# Patient Record
Sex: FEMALE | Race: WHITE
[De-identification: names, ages, dates, MRNs, and addresses within clinical notes are randomized per-mention and may not be internally consistent; named-entity substitution may affect disease eponyms.]

---

## 2017-07-29 ENCOUNTER — HEALTH MAINTENANCE LETTER (OUTPATIENT)
Age: 32
End: 2017-07-29

## 2017-09-13 ENCOUNTER — TRANSFERRED RECORDS (OUTPATIENT)
Dept: HEALTH INFORMATION MANAGEMENT | Facility: CLINIC | Age: 32
End: 2017-09-13

## 2017-09-19 ENCOUNTER — TRANSFERRED RECORDS (OUTPATIENT)
Dept: HEALTH INFORMATION MANAGEMENT | Facility: CLINIC | Age: 32
End: 2017-09-19

## 2017-09-22 ENCOUNTER — PRE VISIT (OUTPATIENT)
Dept: ORTHOPEDICS | Facility: CLINIC | Age: 32
End: 2017-09-22

## 2017-09-22 ASSESSMENT — ENCOUNTER SYMPTOMS
BACK PAIN: 0
ARTHRALGIAS: 1
STIFFNESS: 1
MUSCLE CRAMPS: 0
NECK PAIN: 0
MYALGIAS: 0
MUSCLE WEAKNESS: 0
SKIN CHANGES: 1
NAIL CHANGES: 0
JOINT SWELLING: 1
POOR WOUND HEALING: 0

## 2017-09-22 NOTE — TELEPHONE ENCOUNTER
1.  Date/reason for appt: 9/25/17 left knee     2.  Referring provider: TOM SALAS     3.  Call to patient (Yes / No - short description): spoke with patient     4.  Previous care at / records requested from: [facility]     5.  Other: faxed request

## 2017-09-25 ENCOUNTER — OFFICE VISIT (OUTPATIENT)
Dept: ORTHOPEDICS | Facility: CLINIC | Age: 32
End: 2017-09-25

## 2017-09-25 VITALS — WEIGHT: 159.2 LBS | BODY MASS INDEX: 26.52 KG/M2 | HEIGHT: 65 IN

## 2017-09-25 DIAGNOSIS — M25.562 LEFT KNEE PAIN, UNSPECIFIED CHRONICITY: Primary | ICD-10-CM

## 2017-09-25 NOTE — PROGRESS NOTES
HISTORY OF PRESENT ILLNESS:  Claudia is a pleasant 32-year-old female with a long history regarding her left knee.  She states that she initially had surgery on her left knee at approximately the age of 18 where she had what sounds to be a plica excision.  She then went on to have a second arthroscopy and staging.  Her most recent surgery was approximately 2 years ago when she underwent what appears to be an arthroscopic lateral release.  She has noted considerable instability since that time where she does not feel she can keep her patella located.  This happens to her frequently.  These episodes cause pain and swelling.  It keeps her from doing what she wants to do.  She states that overall her knee feels weak to her.        She has been referred to me for definitive management as she has felt that she would maybe need an MPFL reconstruction.        PAST MEDICAL HISTORY:  Significant for Raynaud's disease, IgG deficiency, history of lung diagnoses where she has been seen at the [facility] as well as multiple dislocations.  It has been discussed that she may have a hypermobility disorder consistent with Chapis-Danlos or Marfan's syndrome.  This has never been fully diagnosed.      MEDICATIONS:  Per Epic.       ALLERGIES:  Cefzil and Zithromax.      FAMILY HISTORY:  No family history of bleeding or anesthesia problems.      SOCIAL HISTORY:  She is a nonsmoker.  She lives in the [place] area.      REVIEW OF SYSTEMS:  Negative for fever, chills, sweats.  No chest pain, shortness of breath, no nausea, vomiting, no bowel or bladder symptoms.  Ten other systems reviewed are negative.      PHYSICAL EXAMINATION:  On exam today, Claudia is seen to stand 5 feet 5 inches tall.  She weighs 160 pounds.  Her BMI is 26.  Visual inspection shows no redness, no drainage, no suggestion of infection.  Three-quadrant lateral translation of the patella is present.  Tilt to 20 degrees above neutral.  Profound apprehension with lateral  translation of the patella.  Improved with stabilization of the patella.  No definite J tracking.  Lachman 0.  No pivot shift.  Stable to varus and valgus stress.  Stable posterior drawer.  Neurovascularly intact distally.      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      PLAN:  I had a long discussion today with Claudia.  At this time, I reviewed with her, her diagnosis as well as her MRI with her.  I do think she has recurrent patellar instability.  It seems to dislocate on her.  We could recreate her symptoms in clinic with profound apprehension.  She has done multiple courses of bracing and therapy without lasting benefit.  At this time, our recommendation is to proceed with an examination under anesthesia, knee arthroscopy and evaluation of her intraarticular cartilage surfaces including the tibia, femur and meniscus.  We will then plan for an MPFL reconstruction with allograft.  We may perform a closure of her prior performed lateral retinacular release to help improve the stability of her patella.      I discussed with her explicitly the risks, benefits, complications, techniques and alternatives to surgery.  We have reviewed the expected course of recovery and the alternative treatment options.  She understands the limitations of surgery.  She understands that this is better stability increasing surgery than pain decreasing surgery.      We will look for a time on the schedule when this could be complete.  She understands that she may require future surgery and hopefully this surgery will provide a staging opportunity.

## 2017-09-25 NOTE — MR AVS SNAPSHOT
"              After Visit Summary   9/25/2017    Claudia Muñoz    MRN: [id number]           Patient Information     Date Of Birth          1985        Visit Information        Provider Department      9/25/2017 11:10 AM Paul Oakley MD [facility]        Today's Diagnoses     Left knee pain, unspecified chronicity    -  1       Follow-ups after your visit        Who to contact     Please call your clinic at [phone] to:    Ask questions about your health    Make or cancel appointments    Discuss your medicines    Learn about your test results    Speak to your doctor   If you have compliments or concerns about an experience at your clinic, or if you wish to file a complaint, please contact [facility] Patient Relations at [phone] or email us at [email or web address]         Additional Information About Your Visit        MyChart Information     [email or web address] gives you secure access to your electronic health record. If you see a primary care provider, you can also send messages to your care team and make appointments. If you have questions, please call your primary care clinic.  If you do not have a primary care provider, please call [phone] and they will assist you.      PiperScout is an electronic gateway that provides easy, online access to your medical records. With [employer]per[employer]out, you can request a clinic appointment, read your test results, renew a prescription or communicate with your care team.     To access your existing account, please contact your [facility] or call [phone] for assistance.        Care EveryWhere ID     This is your Care EveryWhere ID. This could be used by other organizations to access your [place] medical records  [id number]        Your Vitals Were     Height BMI (Body Mass Index)                1.651 m (5' 5\") 26.49 kg/m2           Blood Pressure from Last 3 Encounters:   04/17/15 " 117/79    Weight from Last 3 Encounters:   09/25/17 72.2 kg (159 lb 3.2 oz)   04/17/15 73.8 kg (162 lb 9.6 oz)              We Performed the Following     Jo-Operative Worksheet        Primary Care Provider Office Phone # Fax #    [facility] [phone]       [address] [facility] [address]        Equal Access to Services     FAUSTO DAVILA : Hadii aad ku hadasho Soomaali, waaxda luqadaha, qaybta kaalmada adeegyada, waxay idiin hayaan adeeg khjoo laNolanzafar . So [facility] [phone].    ATENCIÓN: Si lonny chacon, tiene a azul disposición servicios gratuitos de asistencia lingüística. Llame al [phone].    We comply with applicable federal civil rights laws and [place] laws. We do not discriminate on the basis of race, color, national origin, age, disability sex, sexual orientation or gender identity.            Thank you!     Thank you for choosing [facility]  for your care. Our goal is always to provide you with excellent care. Hearing back from our patients is one way we can continue to improve our services. Please take a few minutes to complete the written survey that you may receive in the mail after your visit with us. Thank you!             Your Updated Medication List - Protect others around you: Learn how to safely use, store and throw away your medicines at www.disposemymeds.org.          This list is accurate as of: 9/25/17 11:59 PM.  Always use your most recent med list.                   Brand Name Dispense Instructions for use Diagnosis    AMLODIPINE BESYLATE PO      Take 10 mg by mouth Take daily in the winter for Raynaud's        PROAIR  (90 BASE) MCG/ACT Inhaler   Generic drug:  albuterol      Inhale 2 puffs into the lungs every 4 hours as needed for shortness of breath / dyspnea or wheezing        traZODone 50 MG tablet    DESYREL     Take 50 mg by mouth At Bedtime        ZOVIA 1/35E (28) 1-35 MG-MCG per tablet   Generic drug:   ethynodiol-ethinyl estradiol      Take 1 tablet by mouth daily

## 2017-09-25 NOTE — TELEPHONE ENCOUNTER
Clinic nurse calling,     did we get any records yet?  No, not in [facility].    appt notes say something about sent to [phone], who is that?   Not [facility], sounds more like a clinic fax; nurse will check addl clinic faxes for records.

## 2017-09-25 NOTE — LETTER
9/25/2017      RE: Claudia Muñoz  [address]  [facility] [address]       HISTORY OF PRESENT ILLNESS:  Claudia is a pleasant 32-year-old female with a long history regarding her left knee.  She states that she initially had surgery on her left knee at approximately the age of 18 where she had what sounds to be a plica excision.  She then went on to have a second arthroscopy and staging.  Her most recent surgery was approximately 2 years ago when she underwent what appears to be an arthroscopic lateral release.  She has noted considerable instability since that time where she does not feel she can keep her patella located.  This happens to her frequently.  These episodes cause pain and swelling.  It keeps her from doing what she wants to do.  She states that overall her knee feels weak to her.        She has been referred to me for definitive management as she has felt that she would maybe need an MPFL reconstruction.        PAST MEDICAL HISTORY:  Significant for Raynaud's disease, IgG deficiency, history of lung diagnoses where she has been seen at the [facility] as well as multiple dislocations.  It has been discussed that she may have a hypermobility disorder consistent with Chapis-Danlos or Marfan's syndrome.  This has never been fully diagnosed.      MEDICATIONS:  Per Epic.       ALLERGIES:  Cefzil and Zithromax.      FAMILY HISTORY:  No family history of bleeding or anesthesia problems.      SOCIAL HISTORY:  She is a nonsmoker.  She lives in the [facility].      REVIEW OF SYSTEMS:  Negative for fever, chills, sweats.  No chest pain, shortness of breath, no nausea, vomiting, no bowel or bladder symptoms.  Ten other systems reviewed are negative.      PHYSICAL EXAMINATION:  On exam today, Claudia is seen to stand 5 feet 5 inches tall.  She weighs 160 pounds.  Her BMI is 26.  Visual inspection shows no redness, no drainage, no suggestion of infection.  Three-quadrant lateral translation of the  patella is present.  Tilt to 20 degrees above neutral.  Profound apprehension with lateral translation of the patella.  Improved with stabilization of the patella.  No definite J tracking.  Lachman 0.  No pivot shift.  Stable to varus and valgus stress.  Stable posterior drawer.  Neurovascularly intact distally.      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      PLAN:  I had a long discussion today with Claudia.  At this time, I reviewed with her, her diagnosis as well as her MRI with her.  I do think she has recurrent patellar instability.  It seems to dislocate on her.  We could recreate her symptoms in clinic with profound apprehension.  She has done multiple courses of bracing and therapy without lasting benefit.  At this time, our recommendation is to proceed with an examination under anesthesia, knee arthroscopy and evaluation of her intraarticular cartilage surfaces including the tibia, femur and meniscus.  We will then plan for an MPFL reconstruction with allograft.  We may perform a closure of her prior performed lateral retinacular release to help improve the stability of her patella.      I discussed with her explicitly the risks, benefits, complications, techniques and alternatives to surgery.  We have reviewed the expected course of recovery and the alternative treatment options.  She understands the limitations of surgery.  She understands that this is better stability increasing surgery than pain decreasing surgery.      We will look for a time on the schedule when this could be complete.  She understands that she may require future surgery and hopefully this surgery will provide a staging opportunity.         Paul Oakley MD

## 2017-09-25 NOTE — NURSING NOTE
"Reason For Visit:   Chief Complaint   Patient presents with     Consult     Left knee pain.        Primary MD: Dr Ginette Cornelius  Ref. MD: Dr Sharp    ?  No  Occupation [employer].  Currently working? Yes.  Work status?  Full time.  Date of injury: none  Date of surgery: 2015  Type of surgery: Arthroscopic release.  Hx of 3-4 surgeries.     Smoker: No      Ht 1.651 m (5' 5\")  Wt 72.2 kg (159 lb 3.2 oz)  BMI 26.49 kg/m2    Pain Assessment  Patient Currently in Pain: Yes  0-10 Pain Scale: 7  Primary Pain Location: Knee  Pain Orientation: Left                                                   Adriana Brewster LPN  "

## 2017-09-25 NOTE — NURSING NOTE
Teaching Flowsheet   Relevant Diagnosis: Left knee cartilage loss  Teaching Topic: Left knee MPFL     Person(s) involved in teaching:   Patient and family member     Motivation Level:  Asks Questions: Yes  Eager to Learn: Yes  Cooperative: Yes  Receptive (willing/able to accept information): Yes  Any cultural factors/Yazidism beliefs that may influence understanding or compliance? No     Patient and Family demonstrates understanding of the following:  Reason for the appointment, diagnosis and treatment plan: Yes  Knowledge of proper use of medications and conditions for which they are ordered (with special attention to potential side effects or drug interactions): Yes  Which situations necessitate calling provider and whom to contact: Yes     Teaching Concerns Addressed:      Proper use and care of assistive devices. (medical equip, care aids, etc.): Yes  Nutritional needs and diet plan: NA  Pain management techniques: Yes  Wound Care: Yes  How and/when to access community resources: Yes     Instructional Materials Used/Given: Preoperative/postoperative teaching packet, surgical soap.     Health history negative per patient.    a. Does the patient take five or more prescription medications? No  b. Does patient have difficulty walking up two flights of stairs? No  c. Is patient s BMI 35 or greater? No  d. Is patient an insulin dependent diabetic? No  e. Does patient have a history of having a heart attack or a heart surgery of any kind? No  f. Does patient have a history of heart failure? No  g. Does the patient have a history of any type of transplant? No  h. Does the patient use inhalers on a daily basis? No  i. Does the patient have a history of pulmonary hypertension? No  Once the patient is evaluated by PAC contact (ex: Surgery schedulers name and number, RN's name and number, etc..);  [phone] Amada  Name of planned surgery______________________________

## 2017-10-17 ENCOUNTER — TRANSFERRED RECORDS (OUTPATIENT)
Dept: HEALTH INFORMATION MANAGEMENT | Facility: CLINIC | Age: 32
End: 2017-10-17

## 2017-10-30 ENCOUNTER — ANESTHESIA EVENT (OUTPATIENT)
Dept: SURGERY | Facility: AMBULATORY SURGERY CENTER | Age: 32
End: 2017-10-30

## 2017-10-31 ENCOUNTER — ANESTHESIA (OUTPATIENT)
Dept: SURGERY | Facility: AMBULATORY SURGERY CENTER | Age: 32
End: 2017-10-31

## 2017-10-31 ENCOUNTER — SURGERY (OUTPATIENT)
Age: 32
End: 2017-10-31

## 2017-10-31 ENCOUNTER — HOSPITAL ENCOUNTER (OUTPATIENT)
Facility: AMBULATORY SURGERY CENTER | Age: 32
End: 2017-10-31
Attending: ORTHOPAEDIC SURGERY

## 2017-10-31 VITALS
RESPIRATION RATE: 16 BRPM | HEIGHT: 65 IN | TEMPERATURE: 97.6 F | DIASTOLIC BLOOD PRESSURE: 59 MMHG | WEIGHT: 150 LBS | SYSTOLIC BLOOD PRESSURE: 94 MMHG | BODY MASS INDEX: 24.99 KG/M2 | OXYGEN SATURATION: 98 % | HEART RATE: 78 BPM

## 2017-10-31 DIAGNOSIS — M25.362 PATELLAR INSTABILITY OF LEFT KNEE: Primary | ICD-10-CM

## 2017-10-31 LAB
HCG UR QL: NEGATIVE
INTERNAL QC OK POCT: YES

## 2017-10-31 RX ORDER — ACETAMINOPHEN 325 MG/1
650 TABLET ORAL
Status: DISCONTINUED | OUTPATIENT
Start: 2017-10-31 | End: 2017-11-01 | Stop reason: HOSPADM

## 2017-10-31 RX ORDER — LIDOCAINE HYDROCHLORIDE 20 MG/ML
INJECTION, SOLUTION INFILTRATION; PERINEURAL PRN
Status: DISCONTINUED | OUTPATIENT
Start: 2017-10-31 | End: 2017-10-31

## 2017-10-31 RX ORDER — FENTANYL CITRATE 50 UG/ML
25-50 INJECTION, SOLUTION INTRAMUSCULAR; INTRAVENOUS
Status: DISCONTINUED | OUTPATIENT
Start: 2017-10-31 | End: 2017-10-31 | Stop reason: HOSPADM

## 2017-10-31 RX ORDER — SODIUM CHLORIDE, SODIUM LACTATE, POTASSIUM CHLORIDE, CALCIUM CHLORIDE 600; 310; 30; 20 MG/100ML; MG/100ML; MG/100ML; MG/100ML
INJECTION, SOLUTION INTRAVENOUS CONTINUOUS
Status: DISCONTINUED | OUTPATIENT
Start: 2017-10-31 | End: 2017-11-01 | Stop reason: HOSPADM

## 2017-10-31 RX ORDER — KETOROLAC TROMETHAMINE 30 MG/ML
INJECTION, SOLUTION INTRAMUSCULAR; INTRAVENOUS PRN
Status: DISCONTINUED | OUTPATIENT
Start: 2017-10-31 | End: 2017-10-31

## 2017-10-31 RX ORDER — NALOXONE HYDROCHLORIDE 0.4 MG/ML
.1-.4 INJECTION, SOLUTION INTRAMUSCULAR; INTRAVENOUS; SUBCUTANEOUS
Status: DISCONTINUED | OUTPATIENT
Start: 2017-10-31 | End: 2017-11-01 | Stop reason: HOSPADM

## 2017-10-31 RX ORDER — ONDANSETRON 2 MG/ML
4 INJECTION INTRAMUSCULAR; INTRAVENOUS EVERY 30 MIN PRN
Status: DISCONTINUED | OUTPATIENT
Start: 2017-10-31 | End: 2017-11-01 | Stop reason: HOSPADM

## 2017-10-31 RX ORDER — LIDOCAINE 40 MG/G
CREAM TOPICAL
Status: DISCONTINUED | OUTPATIENT
Start: 2017-10-31 | End: 2017-10-31 | Stop reason: HOSPADM

## 2017-10-31 RX ORDER — FLUMAZENIL 0.1 MG/ML
0.2 INJECTION, SOLUTION INTRAVENOUS
Status: DISCONTINUED | OUTPATIENT
Start: 2017-10-31 | End: 2017-10-31 | Stop reason: HOSPADM

## 2017-10-31 RX ORDER — CLINDAMYCIN PHOSPHATE 900 MG/50ML
900 INJECTION, SOLUTION INTRAVENOUS SEE ADMIN INSTRUCTIONS
Status: DISCONTINUED | OUTPATIENT
Start: 2017-10-31 | End: 2017-10-31 | Stop reason: HOSPADM

## 2017-10-31 RX ORDER — HYDROXYZINE HYDROCHLORIDE 25 MG/1
25 TABLET, FILM COATED ORAL EVERY 6 HOURS PRN
Qty: 60 TABLET | Refills: 0 | Status: SHIPPED | OUTPATIENT
Start: 2017-10-31 | End: 2017-12-11

## 2017-10-31 RX ORDER — BUPIVACAINE HYDROCHLORIDE AND EPINEPHRINE 2.5; 5 MG/ML; UG/ML
INJECTION, SOLUTION INFILTRATION; PERINEURAL PRN
Status: DISCONTINUED | OUTPATIENT
Start: 2017-10-31 | End: 2017-10-31 | Stop reason: HOSPADM

## 2017-10-31 RX ORDER — NALOXONE HYDROCHLORIDE 0.4 MG/ML
.1-.4 INJECTION, SOLUTION INTRAMUSCULAR; INTRAVENOUS; SUBCUTANEOUS
Status: DISCONTINUED | OUTPATIENT
Start: 2017-10-31 | End: 2017-10-31 | Stop reason: HOSPADM

## 2017-10-31 RX ORDER — ACETAMINOPHEN 325 MG/1
975 TABLET ORAL ONCE
Status: COMPLETED | OUTPATIENT
Start: 2017-10-31 | End: 2017-10-31

## 2017-10-31 RX ORDER — PROPOFOL 10 MG/ML
INJECTION, EMULSION INTRAVENOUS PRN
Status: DISCONTINUED | OUTPATIENT
Start: 2017-10-31 | End: 2017-10-31

## 2017-10-31 RX ORDER — GABAPENTIN 300 MG/1
300 CAPSULE ORAL ONCE
Status: COMPLETED | OUTPATIENT
Start: 2017-10-31 | End: 2017-10-31

## 2017-10-31 RX ORDER — DEXAMETHASONE SODIUM PHOSPHATE 4 MG/ML
INJECTION, SOLUTION INTRA-ARTICULAR; INTRALESIONAL; INTRAMUSCULAR; INTRAVENOUS; SOFT TISSUE PRN
Status: DISCONTINUED | OUTPATIENT
Start: 2017-10-31 | End: 2017-10-31

## 2017-10-31 RX ORDER — PROPOFOL 10 MG/ML
INJECTION, EMULSION INTRAVENOUS CONTINUOUS PRN
Status: DISCONTINUED | OUTPATIENT
Start: 2017-10-31 | End: 2017-10-31

## 2017-10-31 RX ORDER — ONDANSETRON 2 MG/ML
INJECTION INTRAMUSCULAR; INTRAVENOUS PRN
Status: DISCONTINUED | OUTPATIENT
Start: 2017-10-31 | End: 2017-10-31

## 2017-10-31 RX ORDER — FENTANYL CITRATE 50 UG/ML
INJECTION, SOLUTION INTRAMUSCULAR; INTRAVENOUS PRN
Status: DISCONTINUED | OUTPATIENT
Start: 2017-10-31 | End: 2017-10-31

## 2017-10-31 RX ORDER — MEPERIDINE HYDROCHLORIDE 25 MG/ML
12.5 INJECTION INTRAMUSCULAR; INTRAVENOUS; SUBCUTANEOUS
Status: DISCONTINUED | OUTPATIENT
Start: 2017-10-31 | End: 2017-11-01 | Stop reason: HOSPADM

## 2017-10-31 RX ORDER — ONDANSETRON 4 MG/1
4-8 TABLET, ORALLY DISINTEGRATING ORAL EVERY 8 HOURS PRN
Qty: 4 TABLET | Refills: 0 | Status: SHIPPED | OUTPATIENT
Start: 2017-10-31 | End: 2017-12-11

## 2017-10-31 RX ORDER — SODIUM CHLORIDE, SODIUM LACTATE, POTASSIUM CHLORIDE, CALCIUM CHLORIDE 600; 310; 30; 20 MG/100ML; MG/100ML; MG/100ML; MG/100ML
INJECTION, SOLUTION INTRAVENOUS CONTINUOUS
Status: DISCONTINUED | OUTPATIENT
Start: 2017-10-31 | End: 2017-10-31 | Stop reason: HOSPADM

## 2017-10-31 RX ORDER — OXYCODONE HYDROCHLORIDE 5 MG/1
5-10 TABLET ORAL
Status: DISCONTINUED | OUTPATIENT
Start: 2017-10-31 | End: 2017-11-01 | Stop reason: HOSPADM

## 2017-10-31 RX ORDER — EPHEDRINE SULFATE 50 MG/ML
INJECTION, SOLUTION INTRAMUSCULAR; INTRAVENOUS; SUBCUTANEOUS PRN
Status: DISCONTINUED | OUTPATIENT
Start: 2017-10-31 | End: 2017-10-31

## 2017-10-31 RX ORDER — HYDROXYZINE HYDROCHLORIDE 25 MG/1
25 TABLET, FILM COATED ORAL
Status: DISCONTINUED | OUTPATIENT
Start: 2017-10-31 | End: 2017-11-01 | Stop reason: HOSPADM

## 2017-10-31 RX ORDER — FENTANYL CITRATE 50 UG/ML
50 INJECTION, SOLUTION INTRAMUSCULAR; INTRAVENOUS ONCE
Status: COMPLETED | OUTPATIENT
Start: 2017-10-31 | End: 2017-10-31

## 2017-10-31 RX ORDER — OXYCODONE HYDROCHLORIDE 5 MG/1
5-10 TABLET ORAL
Qty: 60 TABLET | Refills: 0 | Status: SHIPPED | OUTPATIENT
Start: 2017-10-31 | End: 2017-12-11

## 2017-10-31 RX ORDER — CLINDAMYCIN PHOSPHATE 900 MG/50ML
900 INJECTION, SOLUTION INTRAVENOUS
Status: COMPLETED | OUTPATIENT
Start: 2017-10-31 | End: 2017-10-31

## 2017-10-31 RX ORDER — ONDANSETRON 4 MG/1
4 TABLET, ORALLY DISINTEGRATING ORAL
Status: DISCONTINUED | OUTPATIENT
Start: 2017-10-31 | End: 2017-11-01 | Stop reason: HOSPADM

## 2017-10-31 RX ORDER — FENTANYL CITRATE 50 UG/ML
25-50 INJECTION, SOLUTION INTRAMUSCULAR; INTRAVENOUS
Status: DISCONTINUED | OUTPATIENT
Start: 2017-10-31 | End: 2017-11-01 | Stop reason: HOSPADM

## 2017-10-31 RX ORDER — BUPIVACAINE HYDROCHLORIDE AND EPINEPHRINE 2.5; 5 MG/ML; UG/ML
INJECTION, SOLUTION INFILTRATION; PERINEURAL PRN
Status: DISCONTINUED | OUTPATIENT
Start: 2017-10-31 | End: 2017-10-31

## 2017-10-31 RX ORDER — AMOXICILLIN 250 MG
1-2 CAPSULE ORAL 2 TIMES DAILY
Qty: 30 TABLET | Refills: 0 | Status: SHIPPED | OUTPATIENT
Start: 2017-10-31

## 2017-10-31 RX ORDER — ONDANSETRON 4 MG/1
4 TABLET, ORALLY DISINTEGRATING ORAL EVERY 30 MIN PRN
Status: DISCONTINUED | OUTPATIENT
Start: 2017-10-31 | End: 2017-11-01 | Stop reason: HOSPADM

## 2017-10-31 RX ADMIN — EPHEDRINE SULFATE 5 MG: 50 INJECTION, SOLUTION INTRAMUSCULAR; INTRAVENOUS; SUBCUTANEOUS at 08:26

## 2017-10-31 RX ADMIN — ACETAMINOPHEN 975 MG: 325 TABLET ORAL at 06:33

## 2017-10-31 RX ADMIN — FENTANYL CITRATE 50 MCG: 50 INJECTION, SOLUTION INTRAMUSCULAR; INTRAVENOUS at 07:42

## 2017-10-31 RX ADMIN — EPHEDRINE SULFATE 5 MG: 50 INJECTION, SOLUTION INTRAMUSCULAR; INTRAVENOUS; SUBCUTANEOUS at 08:45

## 2017-10-31 RX ADMIN — PROPOFOL 200 MG: 10 INJECTION, EMULSION INTRAVENOUS at 08:01

## 2017-10-31 RX ADMIN — FENTANYL CITRATE 25 MCG: 50 INJECTION, SOLUTION INTRAMUSCULAR; INTRAVENOUS at 09:33

## 2017-10-31 RX ADMIN — GABAPENTIN 300 MG: 300 CAPSULE ORAL at 06:33

## 2017-10-31 RX ADMIN — ONDANSETRON 4 MG: 2 INJECTION INTRAMUSCULAR; INTRAVENOUS at 10:56

## 2017-10-31 RX ADMIN — LIDOCAINE HYDROCHLORIDE 80 MG: 20 INJECTION, SOLUTION INFILTRATION; PERINEURAL at 08:01

## 2017-10-31 RX ADMIN — DEXAMETHASONE SODIUM PHOSPHATE 4 MG: 4 INJECTION, SOLUTION INTRA-ARTICULAR; INTRALESIONAL; INTRAMUSCULAR; INTRAVENOUS; SOFT TISSUE at 08:04

## 2017-10-31 RX ADMIN — KETOROLAC TROMETHAMINE 30 MG: 30 INJECTION, SOLUTION INTRAMUSCULAR; INTRAVENOUS at 09:43

## 2017-10-31 RX ADMIN — FENTANYL CITRATE 50 MCG: 50 INJECTION, SOLUTION INTRAMUSCULAR; INTRAVENOUS at 08:01

## 2017-10-31 RX ADMIN — BUPIVACAINE HYDROCHLORIDE AND EPINEPHRINE 30 ML: 2.5; 5 INJECTION, SOLUTION INFILTRATION; PERINEURAL at 08:30

## 2017-10-31 RX ADMIN — SODIUM CHLORIDE, SODIUM LACTATE, POTASSIUM CHLORIDE, CALCIUM CHLORIDE: 600; 310; 30; 20 INJECTION, SOLUTION INTRAVENOUS at 06:33

## 2017-10-31 RX ADMIN — FENTANYL CITRATE 25 MCG: 50 INJECTION, SOLUTION INTRAMUSCULAR; INTRAVENOUS at 09:42

## 2017-10-31 RX ADMIN — EPHEDRINE SULFATE 10 MG: 50 INJECTION, SOLUTION INTRAMUSCULAR; INTRAVENOUS; SUBCUTANEOUS at 08:12

## 2017-10-31 RX ADMIN — CLINDAMYCIN PHOSPHATE 900 MG: 900 INJECTION, SOLUTION INTRAVENOUS at 08:03

## 2017-10-31 RX ADMIN — PROPOFOL 200 MCG/KG/MIN: 10 INJECTION, EMULSION INTRAVENOUS at 08:01

## 2017-10-31 RX ADMIN — BUPIVACAINE HYDROCHLORIDE AND EPINEPHRINE 10 ML: 2.5; 5 INJECTION, SOLUTION INFILTRATION; PERINEURAL at 07:35

## 2017-10-31 RX ADMIN — ONDANSETRON 4 MG: 2 INJECTION INTRAMUSCULAR; INTRAVENOUS at 09:26

## 2017-10-31 ASSESSMENT — LIFESTYLE VARIABLES: TOBACCO_USE: 0

## 2017-10-31 ASSESSMENT — ENCOUNTER SYMPTOMS: SEIZURES: 1

## 2017-10-31 NOTE — ANESTHESIA CARE TRANSFER NOTE
Patient: Claudia Muñoz    Procedure(s):  Examination Under Anesthesia Left Knee, Left Knee Arthroscopy, Medial Patellofemoral Reconstruction with Allograft - Wound Class: I-Clean    Diagnosis: Patellar Instability  Diagnosis Additional Information: No value filed.    Anesthesia Type:   General, LMA, Peripheral Nerve Block     Note:  Airway :Face Mask  Patient transferred to:PACU  Comments: Uneventful transport to PACU   Report to RN  Exchanging well; color pink/natl  Pt responds appropriately to command  IV patent  Lips/teeth/dentition as preop status  Questions answered  BP 92.58  HR 89  RR 16  TAT 97.7RR  Sat 100%Handoff Report: Identifed the Patient, Identified the Reponsible Provider, Reviewed the pertinent medical history, Discussed the surgical course, Reviewed Intra-OP anesthesia mangement and issues during anesthesia, Set expectations for post-procedure period and Allowed opportunity for questions and acknowledgement of understanding      Vitals: (Last set prior to Anesthesia Care Transfer)    CRNA VITALS  10/31/2017 0913 - 10/31/2017 0948      10/31/2017             Pulse: 86    SpO2: 100 %    Resp Rate (set): 10                Electronically Signed By: GI ELLIOTT CRNA  October 31, 2017  9:48 AM

## 2017-10-31 NOTE — ADDENDUM NOTE
Addendum  created 10/31/17 1211 by Katia Ricketts APRN CRNA    Anesthesia Intra Flowsheets edited, Anesthesia Intra Meds edited, Anesthesia Intra SmartForms edited

## 2017-10-31 NOTE — IP AVS SNAPSHOT
[facility]    [address] [place] [id number]    Phone:  [phone]    Fax:  [phone]                                       After Visit Summary   10/31/2017    Claudia Muñoz    MRN: [id number]           After Visit Summary Signature Page     I have received my discharge instructions, and my questions have been answered. I have discussed any challenges I see with this plan with the nurse or doctor.    ..........................................................................................................................................  Patient/Patient Representative Signature      ..........................................................................................................................................  Patient Representative Print Name and Relationship to Patient    ..................................................               ................................................  Date                                            Time    ..........................................................................................................................................  Reviewed by Signature/Title    ...................................................              ..............................................  Date                                                            Time

## 2017-10-31 NOTE — PROGRESS NOTES
Left adductor block done preoperatively.  VSS. On 2L NC, sats 95-98%. Tolerated procedure without problems.

## 2017-10-31 NOTE — BRIEF OP NOTE
[facility] Orthopedic Brief Operative Note    Pre-operative diagnosis: Patellar Instability   Post-operative diagnosis: SAME   Procedure: Procedure(s):  ARTHROSCOPY KNEE WITH PATELLAR REALIGNMENT     Surgeon: Paul Oakley*    Assistant(s): Hank Choe MD     Anesthesia: LMA   Estimated blood loss: 10   Total IV fluids: (See anesthesia record)     Drains:   None   Specimens: None   Implants: See dictated operative report for full details.    Findings: See dictated operative report for full details.    Complications: None     Disposition:   Stable to PACU     Plan:  TTWB x1 then WBAT. Wean from crutches and brace after 1 week as tolerated. No running until 8 weeks, side to side sports 3-4 months. Locked HKB x1 week then may unlock at rest for ROM

## 2017-10-31 NOTE — DISCHARGE INSTRUCTIONS
"[facility]  Home Care Following Anesthesia  For 24 hours after surgery:  1. Get plenty of rest.  A responsible adult must stay with you for at least 24 hours after you leave the surgery center.  2. Do not drive or use heavy equipment.  If you have weakness or tingling, don't drive or use heavy equipment until this feeling goes away.   3. Do not drink alcohol.   4. Avoid strenuous or risky activities.  Ask for help when climbing stairs.  5. You may feel lightheaded.  IF so, sit for a few minutes before standing.  Have someone help you get up.   6. If you have nausea (feel sick to your stomach): Drink only clear liquids such as apple juice, ginger ale, broth or 7-Up.  Rest may also help.  Be sure to drink enough fluids.  Move to a regular diet as you feel able.   7. You may have a slight fever.  Call the doctor if your fever is over 100 F (37.7 C) (taken under the tongue) or lasts longer than 24 hours.  8. You may have a dry mouth, a sore throat, muscle aches or trouble sleeping. These should go away after 24 hours.  9. Do not make important or legal decisions.        Today you received an Exparel block to numb the nerves near your surgery site.  This is a block using local anesthetic or \"numbing\" medication injected around the nerves to anesthetize or \"numb\" the area supplied by those nerves.  This block is injected into the muscle layer near your surgical site.  This medication may numb the location where you had surgery up to 72 hours.  If your surgical site is an arm or leg you should be careful with your affected limb, since it is possible to injure your limb without being aware of it due to the numbing.  Until full feeling returns, you should guard against bumping or hitting your limb, and avoid extreme hot or cold temperatures on the skin.  As the block wears off, the feeling will return as a tingling or prickly sensation near your surgical site.  You will experince more " discomfort from your incision as the feeling returns.  You may want to take a pain pill (a narcotic or Tylenol if this was prescribed by your surgeon) when you start to experience mild pain before the pain beomes more severe.  If your pain medications do not control your pain, you should notify your surgeon.    Tips for taking pain medications  To get the best pain relief possible, remember these points:    Take pain medications as directed, before pain becomes severe.    Pain medication can upset your stomach: taking it with food may help.    Constipation is a common side effect of pain medication. Drink plenty of  fluids.    Eat foods high in fiber. Take a stool softener if recommended by your doctor or pharmacist.    Do not drink alcohol, drive or operate machinery while taking pain medications.    Ask about other ways to control pain, such as with heat, ice or relaxation.    Tylenol/Acetaminophen Consumption  To help encourage the safe use of acetaminophen, the makers of TYLENOL  have lowered the maximum daily dose for single-ingredient Extra Strength TYLENOL  (acetaminophen) products sold in the [place] from 8 pills per day (4,000 mg) to 6 pills per day (3,000 mg). The dosing interval has also changed from 2 pills every 4-6 hours to 2 pills every 6 hours.    If you feel your pain relief is insufficient, you may take Tylenol/Acetaminophen in addition to your narcotic pain medication.     Be careful not to exceed 3,000 mg of Tylenol/Acetaminophen in a 24 hour period from all sources.    If you are taking extra strength Tylenol/acetaminophen (500 mg), the maximum dose is 6 tablets in 24 hours.    If you are taking regular strength acetaminophen (325 mg), the maximum dose is 9 tablets in 24 hours.    Call a doctor for any of the followin. Signs of infection (fever, growing tenderness at the surgery site, a large amount of drainage or bleeding, severe pain, foul-smelling drainage, redness, swelling).  2. It has  been over 8 to 10 hours since surgery and you are still not able to urinate (pass water).  3. Headache for over 24 hours.  Your doctor is:       Dr. Paul Oakley, Orthopaedics: [phone]               Or dial [phone] and ask for the resident on call for:  Orthopaedics  For emergency care, call the:  [facility] Emergency Department: [phone] (TTY for hearing impaired: [phone])            Post Operative Instructions: Following Arthroscopy of the Knee    Diet  You have no restrictions on your diet. During the evening following surgery, drink plenty of fluids and eat a light dinner.   Nausea  The anesthesia may produce some nausea. If you feel nauseated, stay in bed, keep your head down, and try drinking fluids such as 7up, tea, or soup.   Discomfort    The amount of pain you can expect is unpredictable. If you have pain that cannot be controlled with the prescription you may have been given, you should notify your doctor.     Some residual swelling and discomfort may occur for one to two weeks.     Elevating your leg or applying an ice pack for approximately 10 minutes may help relieve the pain and/or swelling.     When applying an ice pack, be sure your dressing does not get wet. Wrap plastic around the knee.   Drainage/Dressings    You may expect a small amount of drainage from the incisions on your knee.    If you have Band-Aids only, you may change if soiled.     Keep white steri-strips in place. They will fall off on their own in 7-10 days.     Keep all other dressings in place until seen by your doctor.   Activity    You will be up walking the day of your surgery    Follow the weight bearing instructions given by your doctor.     Crutches may be needed to keep weight off your knee.     Activities such as walking, stair climbing, or driving may be resumed as tolerated or as directed by your doctor.     You may not drive the day of surgery, however, because of the effects of anesthesia.     No  running or sports activities for 1 week after surgery.    You can go back to work or school provided no problems develop such as increase in pain and/or swelling.    You may shower but do not get the dressing wet. You can shower with the steri-strips in place.     Do not soak in water (no baths, hot tubs, swimming pools) until your doctor says it is ok.   Exercise    Point and flex your foot, and rotate your ankle as much as possible during the first few weeks following surgery.    Wiggle your toes often while awake.     Slowly bend and straighten your affected leg as far as you can, unless your doctor tells you otherwise. Do this several times a day starting the day after surgery.    Straight leg raises, quadriceps tightening and toe raises start the day after surgery.  Contact Your Doctor if:    You have a large amount of bleeding.    Green or yellow foul smelling drainage.    Severe pain not relieved by the medication prescribed.     Numbness, tingling, or coldness in your foot or leg.    Increased swelling    Fever above 100.4 or shaking chills.

## 2017-10-31 NOTE — OP NOTE
PREOPERATIVE DIAGNOSIS:  Recurrent left patellar instability.      POSTOPERATIVE DIAGNOSES:   1.  Recurrent left patellar instability.   2.  Status post prior lateral retinacular release.      PROCEDURES:   1.  Examination under anesthesia, left knee.   2.  Left knee arthroscopy.   3.  Arthroscopic loose body removal of chondral debris.   4.  Chondroplasty of lateral tibial plateau.   5.  Medial patellofemoral ligament reconstruction with allograft.   6.  Medial imbrication.      SURGEON:  Paul Oakley MD      ASSISTANTS:     1.  Brown Sánchez MD    2.  Hank Choe MD       OPERATIVE INDICATIONS:  Claudia Muñoz is a pleasant, 32-year-old woman seen in my Orthopedic Clinic.  History, physical and imaging were consistent with recurrent patellar instability.  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  Previously approximately 5 years ago, she underwent a lateral release.  She did not feel like initially she got better.  A medial imbrication-type procedure was performed at the same time; however, she has had recurrent patellar instability.  She does not have confidence, and her patellar keeps her from doing what she wants to do.  She presented to see me in clinic, where I reviewed her potential treatment options.  I offered her a medial patellofemoral ligament reconstruction.  I told her very clearly that this procedure was a better stability procedure than a pain procedure and that it would hopefully improve the stability of her patella; however, she may still have pain going forward.  We also planned to perform an arthroscopy to evaluate the intraarticular structures.  A chondroplasty was indicated as well as a possible ACI biopsy if she should need cartilage reconstruction in the future.  She was apprised of the risks and benefits and desired to proceed.      OPERATIVE DETAILS:  In the  preoperative area, the patient's informed consent was reviewed, and she desired to proceed.  The left knee was marked, and the patient was in agreement.  She was taken to the operating room where a time-out was performed, and all parties were in agreement.  Preoperative antibiotics were given within 1 hour of time of surgery.  She was placed supine on the operating room table, surrendered to LMA anesthesia, and examination under anesthesia was performed.  A 3 quadrant lateral translation of the patella was noted with tilt to 15-20 degrees above neutral.  A 2 quadrant medial translation of the patella.  Stable to varus-valgus stress testing.  Stable anterior and posterior drawer testing.  Trace increased translation to anterior drawer testing as well as Lachman testing, and an end point was intact.  Trace pivot glide was present.      At this time, a bump was placed underneath the ipsilateral hip, and an eggcrate was placed beneath the well leg.  Left leg was prepped and draped in the usual sterile fashion.  No tourniquet was applied.  Anteromedial and anterolateral arthroscopic portals were created, and a diagnostic arthroscopy was performed with the following findings:  No loose bodies in the suprapatellar pouch, medial gutter or lateral gutter.  Medial patellar facet, central ridge and lateral patellar facet were largely intact and showed really normal-appearing cartilage surfaces.  Trochlea was normal.  Lateral trochlea normal.  Distal trochlea normal.  Medial femoral condyle and medial tibial plateau normal.  Medial meniscus normal.  Lateral femoral condyle normal.  Lateral tibial plateau showed grade II chondrosis, though no full-thickness cartilage loss was noted.  Multiple loose chondral flakes were noted within the lateral compartment.  Lateral meniscus was intact.  ACL and PCL were intact, though the ACL did appear to be diminuative.  At this time, a shaver was introduced as well as a grasper, and loose  body removal was performed as well as a chondroplasty of the lateral tibial plateau until a balanced, stable rim of cartilage remained.  She did not require cartilage reconstruction in this area, and in light of this, we did not perform an ACI biopsy.  Her patellofemoral compartment cartilage was intact.  Prior sutures were noted for her medial imbrication.  At this time, we withdrew the arthroscope and removed all excess arthroscopic fluid.  The patient's old medial-based incision was utilized and was carried down to the skin and subcutaneous tissues.  Meticulous hemostasis was ensured.  The medial border of the patella was identified, and two 3-0 SutureTak anchors were placed into the upper third of the patella, providing excellent control of the patella.  These were preloaded with suture tape.  At this time, we had good control of the patella, and our medial retinacular structures were opened, and our imbrication stitch was placed in the medial retinaculum.  At this point, a hemostat was used to route this deep to create a tunnel for our graft.  Our semitendinosis allograft was then thawed and taken to the back table.  A running locking FiberLoop was placed in the tail.  It was seen to fit through a size 6.  A running locking FiberLoop was placed in one tail.  At this time, a perfect lateral radiograph was obtained of the knee.  A Beath pin was used to identify her Schottle point on the medial border, and a Beath pin was placed.  This was brought across the knee and was seen to exit subtly proximal and anterior.  We reamed to a depth of 30 mm.  Passing suture was placed.  The graft was reduced into the tunnel fixed by a 7 x 23 BioComposite screw with excellent purchase.  The graft was then shuttled with an Adson point hemostat deep to the fascia and brought up into the knee.  The knee was placed in neutral rotation, 30-40 degrees of flexion to contain the patella within the groove, and gentle medial translation  "was applied to \"set\" the patella.  Maximum traction was applied to the MPFL graft, and a free needle was used to suture it in place in the medial border of the patella.  The remainder of the graft was then reduced deep to the anterior periosteum of the patella, where it was sutured in place with 0 Vicryl sutures, and a free needle was used to then perform our medial imbrication by tensioning the medial retinacular structures up and over the MPFL graft.  At this time, the knee was brought into extension.  Good checkrein was noted.  Tilt was still just above neutral.  The translation was decreased as well as a firm end point.  Copious irrigation was performed, and a layered closure was initiated with 0 Vicryl, 2-0 Vicryl and Monocryl.  Sterile dressings were applied.  The patient was awakened from anesthesia and transferred to the recovery room in stable condition with stable vital signs after placement of a hinged knee brace.      COMPLICATIONS:  None apparent.      DRAINS:  None.      SPECIMENS:  None.      ESTIMATED BLOOD LOSS:  10 mL      TOURNIQUET TIME:  None.  No tourniquet was placed.      POSTOPERATIVE PLAN:  The patient will be toe-touch weightbearing x1 week.  At 1 week, begin weightbearing as tolerated.  Can wean from her crutches when able after 1 week.  Wean from brace when able and has sufficient quad control, though I would expect this may be up to 4 weeks.  The goal will be to walk into my clinic at 6 weeks.  No brace, no crutches.  She can do her first followup visit locally if she so chooses.  No running until at least 8 weeks and cleared by her physical therapist.  Side-to-side sports to begin at 3-4 months with return-to-game competition between 4-5 months.         RENZO MORALES MD             D: 10/31/2017 09:47   T: 10/31/2017 11:10   MT: william      Name:     STEVE FRANK   MRN:      [id number]        Account:        [id number]   :      1985           Procedure Date: 10/31/2017 "      Document: [id number]

## 2017-10-31 NOTE — ANESTHESIA PROCEDURE NOTES
Peripheral Nerve Block Procedure Note    Staff:     Anesthesiologist:  RADHA CASTELLANO    Resident/CRNA:  ABELARDO PALM    Block performed by resident/CRNA in the presence of a teaching physician      Referred By:  RENZO MORALES  Location: Pre-op  Procedure Start/Stop TImes:      10/31/2017 7:35 AM     10/31/2017 7:45 AM    patient identified, IV checked, site marked, risks and benefits discussed, informed consent, monitors and equipment checked, pre-op evaluation, at physician/surgeon's request and post-op pain management      Correct Patient: Yes      Correct Position: Yes      Correct Site: Yes      Correct Procedure: Yes      Correct Laterality:  Yes    Site Marked:  Yes  Procedure details:     Procedure:  Adductor canal    ASA:  1    Laterality:  Left    Position:  Supine    Sterile Prep: chloraprep      Local skin infiltration:  None    Needle:  Touhy needle    Needle gauge:  21    Needle length (inches):  3.13    Ultrasound: Yes      Ultrasound used to identify targeted nerve, plexus, or vascular structure and placed a needle adjacent to it      Permanent Image entered into patiient's record      Abnormal pain on injection: No      Blood Aspirated: No      Paresthesias:  No    Bleeding at site: No      Test dose negative for signs of intravascular injection: Yes      Bolus via:  Needle    Infusion Method:  Single Shot    Blood aspirated via catheter: No      Complications:  None  Assessment/Narrative:     Injection made incrementally with aspirations every (mL):  3     Local easily visualized during injection under US guidance.    10 mL 0.25% bupivacaine with 1:200 k epi and 10mL Exparel injected around the femoral nerve at the adductor canal.    Off label use of Exparel discussed with patient and off-label use form signed and placed in chart.      Call with questions or concerns,    Abelardo Jensen MD  Anesthesiology Resident   [phone]

## 2017-10-31 NOTE — IP AVS SNAPSHOT
MRN:[id number]                      After Visit Summary   10/31/2017    Claudia Muñoz    MRN: [id number]           Thank you!     Thank you for choosing [facility] for your care. Our goal is always to provide you with excellent care. Hearing back from our patients is one way we can continue to improve our services. Please take a few minutes to complete the written survey that you may receive in the mail after you visit with us. Thank you!        Patient Information     Date Of Birth          1985        About your hospital stay     You were admitted on:  October 31, 2017 [facility]    You were discharged on:  October 31, 2017       Who to Call     For medical emergencies, please call 911.  For non-urgent questions about your medical care, please call your primary care provider or clinic, [phone]  For questions related to your surgery, please call your surgery clinic        Attending Provider     Provider Paul Caballero MD Orthopedics       Primary Care Provider Office Phone # Fax #    Sagar Bolanos [facility] [phone]      After Care Instructions      Diet as Tolerated       Return to diet before surgery, unless instructed otherwise.            Discharge Instructions       Review outpatient procedure discharge instructions with patient as directed by Provider    Toe touch weight bearing x 1 week. Then weight bearing as tolerated in brace, unlocked. Wean from brace thereafter as tolerated            Follow-up Physical Therapy appointment       You will start physical therapy approximately 1 week after surgery. This order will be provided through my clinic and you may do your therapy at the location of your choice.            Ice to affected area       Ice pack to surgical site every 15 minutes per hour for 24 hours            Notify Provider       Please call [phone] during business  hours with questions/concerns. Contact us if you are having fevers >101.5, increased or new drainage from your incisions more than 5 days after surgery, increased pain that is not controlled with your medications or with other concerns. For urgent issues on nights or weekends you may call [phone] and ask to speak to the orthopedic resident on call.     If you need a refill of pain medication please call the clinic ([phone]) during business hours. Refills cannot be given at night or on weekends so please plan ahead if you are running low on medication prior to the weekend.            Remove dressing - at 72 hours       Removed dressing, shower then, leave sterstrips in place.            Return to clinic       You should follow up in the clinic 1-2 weeks after surgery. If this has not already been scheduled please call [phone] for an appointment.            Shower       OK to shower pod 3. Remove dressings then, ok to get wet in shower, no submerging wounds in bath or pool for 2-3 weeks.                  Your next 10 appointments already scheduled     Dec 14, 2017 11:30 AM CST   Return Visit with Paul Oakley MD   [facility] ([facility])    [address] [place] [id number] [phone]              Further instructions from your care team       [facility]  Home Care Following Anesthesia  For 24 hours after surgery:  1. Get plenty of rest.  A responsible adult must stay with you for at least 24 hours after you leave the surgery center.  2. Do not drive or use heavy equipment.  If you have weakness or tingling, don't drive or use heavy equipment until this feeling goes away.   3. Do not drink alcohol.   4. Avoid strenuous or risky activities.  Ask for help when climbing stairs.  5. You may feel lightheaded.  IF so, sit for a few minutes before standing.  Have someone help you get up.   6. If you have  "nausea (feel sick to your stomach): Drink only clear liquids such as apple juice, ginger ale, broth or 7-Up.  Rest may also help.  Be sure to drink enough fluids.  Move to a regular diet as you feel able.   7. You may have a slight fever.  Call the doctor if your fever is over 100 F (37.7 C) (taken under the tongue) or lasts longer than 24 hours.  8. You may have a dry mouth, a sore throat, muscle aches or trouble sleeping. These should go away after 24 hours.  9. Do not make important or legal decisions.        Today you received an Exparel block to numb the nerves near your surgery site.  This is a block using local anesthetic or \"numbing\" medication injected around the nerves to anesthetize or \"numb\" the area supplied by those nerves.  This block is injected into the muscle layer near your surgical site.  This medication may numb the location where you had surgery up to 72 hours.  If your surgical site is an arm or leg you should be careful with your affected limb, since it is possible to injure your limb without being aware of it due to the numbing.  Until full feeling returns, you should guard against bumping or hitting your limb, and avoid extreme hot or cold temperatures on the skin.  As the block wears off, the feeling will return as a tingling or prickly sensation near your surgical site.  You will experince more discomfort from your incision as the feeling returns.  You may want to take a pain pill (a narcotic or Tylenol if this was prescribed by your surgeon) when you start to experience mild pain before the pain beomes more severe.  If your pain medications do not control your pain, you should notify your surgeon.    Tips for taking pain medications  To get the best pain relief possible, remember these points:    Take pain medications as directed, before pain becomes severe.    Pain medication can upset your stomach: taking it with food may help.    Constipation is a common side effect of pain " medication. Drink plenty of  fluids.    Eat foods high in fiber. Take a stool softener if recommended by your doctor or pharmacist.    Do not drink alcohol, drive or operate machinery while taking pain medications.    Ask about other ways to control pain, such as with heat, ice or relaxation.    Tylenol/Acetaminophen Consumption  To help encourage the safe use of acetaminophen, the makers of TYLENOL  have lowered the maximum daily dose for single-ingredient Extra Strength TYLENOL  (acetaminophen) products sold in the [place] from 8 pills per day (4,000 mg) to 6 pills per day (3,000 mg). The dosing interval has also changed from 2 pills every 4-6 hours to 2 pills every 6 hours.    If you feel your pain relief is insufficient, you may take Tylenol/Acetaminophen in addition to your narcotic pain medication.     Be careful not to exceed 3,000 mg of Tylenol/Acetaminophen in a 24 hour period from all sources.    If you are taking extra strength Tylenol/acetaminophen (500 mg), the maximum dose is 6 tablets in 24 hours.    If you are taking regular strength acetaminophen (325 mg), the maximum dose is 9 tablets in 24 hours.    Call a doctor for any of the followin. Signs of infection (fever, growing tenderness at the surgery site, a large amount of drainage or bleeding, severe pain, foul-smelling drainage, redness, swelling).  2. It has been over 8 to 10 hours since surgery and you are still not able to urinate (pass water).  3. Headache for over 24 hours.  Your doctor is:       Dr. Paul Oakley, Orthopaedics: [phone]               Or dial [phone] and ask for the resident on call for:  Orthopaedics  For emergency care, call the:  [facility] Emergency Department: [phone] (TTY for hearing impaired: [phone])            Post Operative Instructions: Following Arthroscopy of the Knee    Diet  You have no restrictions on your diet. During the evening following surgery, drink plenty of fluids and eat a light  dinner.   Nausea  The anesthesia may produce some nausea. If you feel nauseated, stay in bed, keep your head down, and try drinking fluids such as 7up, tea, or soup.   Discomfort    The amount of pain you can expect is unpredictable. If you have pain that cannot be controlled with the prescription you may have been given, you should notify your doctor.     Some residual swelling and discomfort may occur for one to two weeks.     Elevating your leg or applying an ice pack for approximately 10 minutes may help relieve the pain and/or swelling.     When applying an ice pack, be sure your dressing does not get wet. Wrap plastic around the knee.   Drainage/Dressings    You may expect a small amount of drainage from the incisions on your knee.    If you have Band-Aids only, you may change if soiled.     Keep white steri-strips in place. They will fall off on their own in 7-10 days.     Keep all other dressings in place until seen by your doctor.   Activity    You will be up walking the day of your surgery    Follow the weight bearing instructions given by your doctor.     Crutches may be needed to keep weight off your knee.     Activities such as walking, stair climbing, or driving may be resumed as tolerated or as directed by your doctor.     You may not drive the day of surgery, however, because of the effects of anesthesia.     No running or sports activities for 1 week after surgery.    You can go back to work or school provided no problems develop such as increase in pain and/or swelling.    You may shower but do not get the dressing wet. You can shower with the steri-strips in place.     Do not soak in water (no baths, hot tubs, swimming pools) until your doctor says it is ok.   Exercise    Point and flex your foot, and rotate your ankle as much as possible during the first few weeks following surgery.    Wiggle your toes often while awake.     Slowly bend and straighten your affected leg as far as you can, unless  "your doctor tells you otherwise. Do this several times a day starting the day after surgery.    Straight leg raises, quadriceps tightening and toe raises start the day after surgery.  Contact Your Doctor if:    You have a large amount of bleeding.    Green or yellow foul smelling drainage.    Severe pain not relieved by the medication prescribed.     Numbness, tingling, or coldness in your foot or leg.    Increased swelling    Fever above 100.4 or shaking chills.           Pending Results     No orders found from 10/29/2017 to 11/1/2017.            Admission Information     Date & Time Provider Department Dept. Phone    10/31/2017 Paul Oakley MD [facility] [phone]      Your Vitals Were     Blood Pressure Pulse Temperature Respirations Height Weight    107/69 78 98.6  F (37  C) (Oral) 12 1.651 m (5' 5\") 68 kg (150 lb)    Last Period Pulse Oximetry BMI (Body Mass Index)             10/17/2017 95% 24.96 kg/m2         [employer] Information     [employer] gives you secure access to your electronic health record. If you see a primary care provider, you can also send messages to your care team and make appointments. If you have questions, please call your primary care clinic.  If you do not have a primary care provider, please call [phone] and they will assist you.      [employer] is an electronic gateway that provides easy, online access to your medical records. With [employer], you can request a clinic appointment, read your test results, renew a prescription or communicate with your care team.     To access your existing account, please contact your [facility] or call [phone] for assistance.        Care EveryWhere ID     This is your Care EveryWhere ID. This could be used by other organizations to access your [facility] medical records  [id number]        Equal Access to Services     FAUSTO DAVILA AH: samuel Lopez " carol ann, vinod kacandis prather, wilmer allen ah. So [facility] [phone].    ATENCIÓN: Si lonny chacon, tiene a azul disposición servicios gratuitos de asistencia lingüística. Llame al [phone].    We comply with applicable federal civil rights laws and [place] laws. We do not discriminate on the basis of race, color, national origin, age, disability, sex, sexual orientation, or gender identity.               Review of your medicines      UNREVIEWED medicines. Ask your doctor about these medicines        Dose / Directions    AMLODIPINE BESYLATE PO        Dose:  10 mg   Take 10 mg by mouth Take daily in the winter for Raynaud's   Refills:  0       IBUPROFEN PO        Dose:  400 mg   Take 400 mg by mouth daily as needed for moderate pain   Refills:  0       MELATONIN PO        Dose:  10 mg   Take 10 mg by mouth At Bedtime   Refills:  0       PROAIR  (90 BASE) MCG/ACT Inhaler   Generic drug:  albuterol        Dose:  2 puff   Inhale 2 puffs into the lungs every 4 hours as needed for shortness of breath / dyspnea or wheezing   Refills:  0       traZODone 50 MG tablet   Commonly known as:  DESYREL        Dose:  50 mg   Take 50 mg by mouth nightly as needed   Refills:  0       ZOVIA 1/35E (28) 1-35 MG-MCG per tablet   Generic drug:  ethynodiol-ethinyl estradiol        Dose:  1 tablet   Take 1 tablet by mouth At Bedtime   Refills:  0         START taking        Dose / Directions    hydrOXYzine 25 MG tablet   Commonly known as:  ATARAX   Used for:  Patellar instability of left knee        Dose:  25 mg   Take 1 tablet (25 mg) by mouth every 6 hours as needed for itching (and nausea)   Quantity:  60 tablet   Refills:  0       ondansetron 4 MG ODT tab   Commonly known as:  ZOFRAN-ODT   Used for:  Patellar instability of left knee        Dose:  4-8 mg   Take 1-2 tablets (4-8 mg) by mouth every 8 hours as needed for nausea Dissolve ON the tongue.   Quantity:  4 tablet   Refills:  0        oxyCODONE 5 MG IR tablet   Commonly known as:  ROXICODONE   Used for:  Patellar instability of left knee        Dose:  5-10 mg   Take 1-2 tablets (5-10 mg) by mouth every 3 hours as needed for pain or other (Moderate to Severe)   Quantity:  60 tablet   Refills:  0       senna-docusate 8.6-50 MG per tablet   Commonly known as:  SENOKOT-S;PERICOLACE   Used for:  Patellar instability of left knee        Dose:  1-2 tablet   Take 1-2 tablets by mouth 2 times daily Take while on oral narcotics to prevent or treat constipation.   Quantity:  30 tablet   Refills:  0            Where to get your medicines      These medications were sent to [address] [place] [address] [address] [place] [id number]    Hours:  TRANSPLANT PHONE NUMBER [phone] Phone:  [phone]     hydrOXYzine 25 MG tablet    ondansetron 4 MG ODT tab    senna-docusate 8.6-50 MG per tablet         Some of these will need a paper prescription and others can be bought over the counter. Ask your nurse if you have questions.     Bring a paper prescription for each of these medications     oxyCODONE 5 MG IR tablet                Protect others around you: Learn how to safely use, store and throw away your medicines at www.disposemymeds.org.             Medication List: This is a list of all your medications and when to take them. Check marks below indicate your daily home schedule. Keep this list as a reference.      Medications           Morning Afternoon Evening Bedtime As Needed    AMLODIPINE BESYLATE PO   Take 10 mg by mouth Take daily in the winter for Raynaud's                                hydrOXYzine 25 MG tablet   Commonly known as:  ATARAX   Take 1 tablet (25 mg) by mouth every 6 hours as needed for itching (and nausea)                                IBUPROFEN PO   Take 400 mg by mouth daily as needed for moderate pain                                MELATONIN PO   Take 10 mg  by mouth At Bedtime                                ondansetron 4 MG ODT tab   Commonly known as:  ZOFRAN-ODT   Take 1-2 tablets (4-8 mg) by mouth every 8 hours as needed for nausea Dissolve ON the tongue.                                oxyCODONE 5 MG IR tablet   Commonly known as:  ROXICODONE   Take 1-2 tablets (5-10 mg) by mouth every 3 hours as needed for pain or other (Moderate to Severe)                                PROAIR  (90 BASE) MCG/ACT Inhaler   Inhale 2 puffs into the lungs every 4 hours as needed for shortness of breath / dyspnea or wheezing   Generic drug:  albuterol                                senna-docusate 8.6-50 MG per tablet   Commonly known as:  SENOKOT-S;PERICOLACE   Take 1-2 tablets by mouth 2 times daily Take while on oral narcotics to prevent or treat constipation.                                traZODone 50 MG tablet   Commonly known as:  DESYREL   Take 50 mg by mouth nightly as needed                                ZOVIA 1/35E (28) 1-35 MG-MCG per tablet   Take 1 tablet by mouth At Bedtime   Generic drug:  ethynodiol-ethinyl estradiol

## 2017-10-31 NOTE — ANESTHESIA PREPROCEDURE EVALUATION
Anesthesia Evaluation     . Pt has had prior anesthetic. Type: General and MAC    No history of anesthetic complications          ROS/MED HX    ENT/Pulmonary:     (+), recent URI (resolved with ABx) . .   (-) tobacco use and asthma   Neurologic:  - neg neurologic ROS   (+)seizures last seizure: as  a baby     Cardiovascular:  - neg cardiovascular ROS   (+) ----. : . . . :. . No previous cardiac testing       METS/Exercise Tolerance:  >4 METS   Hematologic:  - neg hematologic  ROS       Musculoskeletal:   (+) , , other musculoskeletal- Left knee osteoarthrosis       GI/Hepatic:  - neg GI/hepatic ROS       Renal/Genitourinary:  - ROS Renal section negative       Endo:         Psychiatric:     (+) psychiatric history       Infectious Disease:  - neg infectious disease ROS       Malignancy:      - no malignancy   Other: Comment: raynauds   - neg other ROS             Procedure: Procedure(s):  Examination Under Anesthesia Left Knee, Left Knee Arthroscopy, Medial Patellofemoral Reconstruction with Allograft, Closure of Lateral Lengthening  (Choice Anesthesia) - Wound Class: I-Clean    HPI: Claudia Muñoz is a 32 year old female with history of left knee osteoarthrosis s/p multiple surgical interventions. Presents electively for left knee arthroscopy and MPFL reconstruction under general anesthesia. PMH significant for Raynaud's disease, IgG deficiency, history of lung diagnoses where she has been seen at the [facility].  It has been discussed that she may have a hypermobility disorder consistent with Chapis-Danlos or Marfan's syndrome.  This has never been fully diagnosed.     PMHx/PSHx:  Past Medical History:   Diagnosis Date     Menarche age 12       History reviewed. No pertinent surgical history.      Current Outpatient Prescriptions on File Prior to Encounter:  traZODone (DESYREL) 50 MG tablet Take 50 mg by mouth nightly as needed    albuterol (ALBUTEROL) 108 (90 BASE) MCG/ACT inhaler Inhale 2 puffs into the lungs  every 4 hours as needed for shortness of breath / dyspnea or wheezing   AMLODIPINE BESYLATE PO Take 10 mg by mouth Take daily in the winter for Raynaud's   ethynodiol-ethinyl estradiol (ZOVIA 1/35E, 28,) 1-35 MG-MCG per tablet Take 1 tablet by mouth At Bedtime      No current facility-administered medications on file prior to encounter.     Social Hx:   Social History   Substance Use Topics     Smoking status: Never Smoker     Smokeless tobacco: Never Used     Alcohol use Yes      Comment: 2/week       Allergies:   Allergies   Allergen Reactions     Cefzil [Cefprozil]      Zithromax [Azithromycin]        NPO Status: > 8 hours     Labs: Pregnancy test pending, no indication for other labs.    Physical Exam  Normal systems: dental    Airway   Mallampati: II  TM distance: >3 FB  Neck ROM: full    Dental     Cardiovascular       Pulmonary               Anesthesia Plan      History & Physical Review  History and physical reviewed and following examination; no interval change.    ASA Status:  2 .    NPO Status:  > 2 hours and > 6 hours    Plan for General, LMA and Peripheral Nerve Block with Intravenous induction. Maintenance will be TIVA.    PONV prophylaxis:  Ondansetron (or other 5HT-3) and Dexamethasone or Solumedrol  Discussed risks, benefits, and alternatives of general anesthesia with the patient. Risks discussed included nausea/vomiting, sore throat, dental damage, soft tissue damage, problems with heart, brain, lungs, and rare allergic reactions. Patient was given a chance to ask questions. Patient consents to procedure.     Discussed ACB with off-label use of liposomal bupivacaine. Questions answered. Consent signed.       Postoperative Care      Consents  Anesthetic plan, risks, benefits and alternatives discussed with:  Patient.  Use of blood products discussed: No .   .         Airway: Not documented prior intubations     Special Considerations:  Left adductor canal block preoperatively     Plan:   - ASA 1  -  Preoperative left adductor canal block   - General with LMA. Standard ASA monitors, IV induction, balanced anesthetic  - PIV x 1  - Antibiotics per orthopedic surgery  - PONV prophylaxis  - Pain management with Fentanyl/dilaudid boluses     Abelardo Jensen MD  Anesthesiology Resident  [phone]

## 2017-11-03 DIAGNOSIS — M25.362 PATELLAR INSTABILITY OF LEFT KNEE: Primary | ICD-10-CM

## 2017-12-04 ASSESSMENT — ENCOUNTER SYMPTOMS
SNORES LOUDLY: 0
MUSCLE CRAMPS: 0
POSTURAL DYSPNEA: 0
MUSCLE WEAKNESS: 0
MYALGIAS: 0
SPUTUM PRODUCTION: 0
WHEEZING: 0
NECK PAIN: 0
SHORTNESS OF BREATH: 1
HEMOPTYSIS: 0
COUGH: 1
COUGH DISTURBING SLEEP: 1
STIFFNESS: 1
DYSPNEA ON EXERTION: 0
ARTHRALGIAS: 0
BACK PAIN: 0
JOINT SWELLING: 1

## 2017-12-11 ENCOUNTER — OFFICE VISIT (OUTPATIENT)
Dept: ORTHOPEDICS | Facility: CLINIC | Age: 32
End: 2017-12-11

## 2017-12-11 VITALS — BODY MASS INDEX: 25.22 KG/M2 | WEIGHT: 151.4 LBS | HEIGHT: 65 IN

## 2017-12-11 DIAGNOSIS — M25.362 PATELLAR INSTABILITY OF LEFT KNEE: Primary | ICD-10-CM

## 2017-12-11 RX ORDER — CITALOPRAM HYDROBROMIDE 40 MG/1
TABLET ORAL
COMMUNITY

## 2017-12-11 NOTE — LETTER
12/11/2017       RE: Claudia Muñoz  [address]  [place] [address]     Dear Colleague,    Thank you for referring your patient, Claudia Muñoz, to the [facility]. Please see a copy of my visit note below.    HISTORY OF PRESENT ILLNESS:  Claudia is a pleasant 32-year-old female seen through my Orthopaedic Clinic today, 6 weeks following MPFL reconstruction to her left knee.  Doing very well.  SANE score of 70 now versus 10 preop.  Much happier with the instability, no recurrent instability events.      PHYSICAL EXAMINATION:  Pleasant woman in no acute distress, articulate and interactive.  Visual inspection shows no redness, no drainage is no suggestion of infection.  Lachman 0.  No pivot shift.  1-2 quadrant lateral translation of the patella with excellent endpoint.  No apprehension.      CLINICAL ASSESSMENT:  Six weeks following MPFL reconstruction with excellent course to date.      PLAN:  I had a long discussion today with Claudia.  At this time, weightbearing as tolerated and motion as tolerated.  Follow up in my clinic on an as-needed basis going forward.  No running (she does not run) until 10 weeks or so postop.  Then she can transition back to doing the things that she wants to do.  All questions were answered.         Again, thank you for allowing me to participate in the care of your patient.      Sincerely,    Paul Oakley MD

## 2017-12-11 NOTE — PROGRESS NOTES
HISTORY OF PRESENT ILLNESS:  Claudia is a pleasant 32-year-old female seen through my Orthopaedic Clinic today, 6 weeks following MPFL reconstruction to her left knee.  Doing very well.  SANE score of 70 now versus 10 preop.  Much happier with the instability, no recurrent instability events.      PHYSICAL EXAMINATION:  Pleasant woman in no acute distress, articulate and interactive.  Visual inspection shows no redness, no drainage is no suggestion of infection.  Lachman 0.  No pivot shift.  1-2 quadrant lateral translation of the patella with excellent endpoint.  No apprehension.      CLINICAL ASSESSMENT:  Six weeks following MPFL reconstruction with excellent course to date.      PLAN:  I had a long discussion today with Claudia.  At this time, weightbearing as tolerated and motion as tolerated.  Follow up in my clinic on an as-needed basis going forward.  No running (she does not run) until 10 weeks or so postop.  Then she can transition back to doing the things that she wants to do.  All questions were answered.

## 2017-12-11 NOTE — NURSING NOTE
"Reason For Visit:   Chief Complaint   Patient presents with     Surgical Followup     S/P Examination Under Anesthesia Left Knee, Left Knee Arthroscopy, Medial Patellofemoral Reconstruction with Allograft. DOS: 10/31/2017.       Pain Assessment  Patient Currently in Pain: No               HEIGHT: 5' 5\", WEIGHT: 151 lbs 6.4 oz, BMI: Body mass index is 25.19 kg/(m^2).      Current Outpatient Prescriptions   Medication Sig Dispense Refill     citalopram (CELEXA) 40 MG tablet        senna-docusate (SENOKOT-S;PERICOLACE) 8.6-50 MG per tablet Take 1-2 tablets by mouth 2 times daily Take while on oral narcotics to prevent or treat constipation. 30 tablet 0     MELATONIN PO Take 10 mg by mouth At Bedtime       IBUPROFEN PO Take 400 mg by mouth daily as needed for moderate pain       traZODone (DESYREL) 50 MG tablet Take 50 mg by mouth nightly as needed        albuterol (ALBUTEROL) 108 (90 BASE) MCG/ACT inhaler Inhale 2 puffs into the lungs every 4 hours as needed for shortness of breath / dyspnea or wheezing       AMLODIPINE BESYLATE PO Take 10 mg by mouth Take daily in the winter for Raynaud's       ethynodiol-ethinyl estradiol (ZOVIA 1/35E, 28,) 1-35 MG-MCG per tablet Take 1 tablet by mouth At Bedtime             Allergies   Allergen Reactions     Cefzil [Cefprozil]      Zithromax [Azithromycin]                "

## 2017-12-11 NOTE — MR AVS SNAPSHOT
"              After Visit Summary   12/11/2017    Claudia Muñoz    MRN: [id number]           Patient Information     Date Of Birth          1985        Visit Information        Provider Department      12/11/2017 11:20 AM Paul Oakley MD [facility]        Today's Diagnoses     Patellar instability of left knee    -  1       Follow-ups after your visit        Who to contact     Please call your clinic at [phone] to:    Ask questions about your health    Make or cancel appointments    Discuss your medicines    Learn about your test results    Speak to your doctor   If you have compliments or concerns about an experience at your clinic, or if you wish to file a complaint, please contact [facility] Patient Relations at [phone] or email us at [email or web address]         Additional Information About Your Visit        MyChart Information     [employer] gives you secure access to your electronic health record. If you see a primary care provider, you can also send messages to your care team and make appointments. If you have questions, please call your primary care clinic.  If you do not have a primary care provider, please call [phone] and they will assist you.      [employer] is an electronic gateway that provides easy, online access to your medical records. With [employer], you can request a clinic appointment, read your test results, renew a prescription or communicate with your care team.     To access your existing account, please contact your [facility] or call [phone] for assistance.        Care EveryWhere ID     This is your Care EveryWhere ID. This could be used by other organizations to access your [place] medical records  [id number]        Your Vitals Were     Height BMI (Body Mass Index)                5' 5\" (1.651 m) 25.19 kg/m2           Blood Pressure from Last 3 Encounters:   10/31/17 " 94/59   04/17/15 117/79    Weight from Last 3 Encounters:   12/11/17 151 lb 6.4 oz (68.7 kg)   10/31/17 150 lb (68 kg)   09/25/17 159 lb 3.2 oz (72.2 kg)              Today, you had the following     No orders found for display       Primary Care Provider Office Phone # Fax #    Sagar Bolanos [facility] [phone]       [address] [facility] [address]        Equal Access to Services     FAUSTO DAVILA : Hadii aad ku hadasho Soomaali, waaxda luqadaha, qaybta kaalmada adeegyada, waxay idiin hayaan ted siegel. So [facility] [phone].    ATENCIÓN: Si habla español, tiene a azul disposición servicios gratuitos de asistencia lingüística. LlVan [facility] [phone].    We comply with applicable federal civil rights laws and [place] laws. We do not discriminate on the basis of race, color, national origin, age, disability, sex, sexual orientation, or gender identity.            Thank you!     Thank you for choosing [facility]  for your care. Our goal is always to provide you with excellent care. Hearing back from our patients is one way we can continue to improve our services. Please take a few minutes to complete the written survey that you may receive in the mail after your visit with us. Thank you!             Your Updated Medication List - Protect others around you: Learn how to safely use, store and throw away your medicines at www.disposemymeds.org.          This list is accurate as of: 12/11/17 11:59 PM.  Always use your most recent med list.                   Brand Name Dispense Instructions for use Diagnosis    AMLODIPINE BESYLATE PO      Take 10 mg by mouth Take daily in the winter for Raynaud's        citalopram 40 MG tablet    celeXA          IBUPROFEN PO      Take 400 mg by mouth daily as needed for moderate pain        MELATONIN PO      Take 10 mg by mouth At Bedtime        PROAIR  (90 BASE) MCG/ACT Inhaler   Generic drug:  albuterol      Inhale 2 puffs into  the lungs every 4 hours as needed for shortness of breath / dyspnea or wheezing        senna-docusate 8.6-50 MG per tablet    SENOKOT-S;PERICOLACE    30 tablet    Take 1-2 tablets by mouth 2 times daily Take while on oral narcotics to prevent or treat constipation.    Patellar instability of left knee       traZODone 50 MG tablet    DESYREL     Take 50 mg by mouth nightly as needed        ZOVIA 1/35E (28) 1-35 MG-MCG per tablet   Generic drug:  ethynodiol-ethinyl estradiol      Take 1 tablet by mouth At Bedtime

## 2018-07-16 ENCOUNTER — OFFICE VISIT (OUTPATIENT)
Dept: ORTHOPEDICS | Facility: CLINIC | Age: 33
End: 2018-07-16
Payer: COMMERCIAL

## 2018-07-16 VITALS — HEIGHT: 65 IN | WEIGHT: 133 LBS | BODY MASS INDEX: 22.16 KG/M2

## 2018-07-16 DIAGNOSIS — M25.362 PATELLAR INSTABILITY OF LEFT KNEE: Primary | ICD-10-CM

## 2018-07-16 NOTE — PROGRESS NOTES
HISTORY OF PRESENT ILLNESS:  Claudia is a pleasant 32-year-old female seen through my Orthopaedic Clinic today,9 months following MPFL reconstruction to her left knee.  Has been having knee pain recently. No injury, no recurrent instability events. Pain is medial knee. Worse with activity, sitting for prolonged periods. Had a steroid and visco supplementation injections without any relief whatsoever. She has been wearing her brace which helps with clicking, instability sensation but not with pain. MRI done in [place]. Here for f/u results.     Works at [employer]. No other activities.      PHYSICAL EXAMINATION:  Radha woman in no acute distress, articulate and interactive.  Visual inspection shows no redness, no drainage is no suggestion of infection.  Lachman 0.  No pivot shift.  1-2 quadrant lateral translation of the patella with excellent endpoint. Graft palpable. No J-sign. No apprehension.      CLINICAL ASSESSMENT:  9 months following MPFL reconstruction with new recurrent pain w/o florecita dislocation or instability. episodes. Discussed her diagnosis with her. We also discussed that despite her MRI findings that she appears to have a functional medial check rein. We will discuss that even if her allograft had torn this would not specifically cause her pain. We do not feel that a revision MPFL reconstruction would make her substantially better or improve her current functioning. We do feel that a dedicated and lifelong therapy and strengthening program would benefit her substantially and continue to provide improvement regardless of her long-term outcome. We also discussed that a interarticular steroid injection option as well although she is not seen substantial benefit from this and did not want to pursue this today. She was given instructions on strengthening and therapy today.      PLAN:   Follow-up at 1 year from surgery.  Continue home exercise therapy program.    Seen and discussed with Dr. Oakley.      Answers for HPI/ROS submitted by the patient on 7/9/2018   General Symptoms: No  Skin Symptoms: No  HENT Symptoms: No  EYE SYMPTOMS: No  HEART SYMPTOMS: No  LUNG SYMPTOMS: No  INTESTINAL SYMPTOMS: No  URINARY SYMPTOMS: No  GYNECOLOGIC SYMPTOMS: No  BREAST SYMPTOMS: No  SKELETAL SYMPTOMS: No  BLOOD SYMPTOMS: No  NERVOUS SYSTEM SYMPTOMS: No  MENTAL HEALTH SYMPTOMS: No  PHQ-2 Score: 0

## 2018-07-16 NOTE — LETTER
7/16/2018       RE: Claudia Muñoz  [address]  [place] [address]     Dear Colleague,    Thank you for referring your patient, Clauida Muñoz, to the [facility]. Please see a copy of my visit note below.    HISTORY OF PRESENT ILLNESS:  Claudia is a pleasant 32-year-old female seen through my Orthopaedic Clinic today,9 months following MPFL reconstruction to her left knee.  Has been having knee pain recently. No injury, no recurrent instability events. Pain is medial knee. Worse with activity, sitting for prolonged periods. Had a steroid and visco supplementation injections without any relief whatsoever. She has been wearing her brace which helps with clicking, instability sensation but not with pain. MRI done in [place]. Here for f/u results.     Works at [employer]. No other activities.      PHYSICAL EXAMINATION:  Pleasant woman in no acute distress, articulate and interactive.  Visual inspection shows no redness, no drainage is no suggestion of infection.  Lachman 0.  No pivot shift.  1-2 quadrant lateral translation of the patella with excellent endpoint. Graft palpable. No J-sign. No apprehension.      CLINICAL ASSESSMENT:  9 months following MPFL reconstruction with new recurrent pain w/o florecita dislocation or instability. episodes. Discussed her diagnosis with her. We also discussed that despite her MRI findings that she appears to have a functional medial check rein. We will discuss that even if her allograft had torn this would not specifically cause her pain. We do not feel that a revision MPFL reconstruction would make her substantially better or improve her current functioning. We do feel that a dedicated and lifelong therapy and strengthening program would benefit her substantially and continue to provide improvement regardless of her long-term outcome. We also discussed that a interarticular steroid injection option as well  although she is not seen substantial benefit from this and did not want to pursue this today. She was given instructions on strengthening and therapy today.      PLAN:   Follow-up at 1 year from surgery.  Continue home exercise therapy program.    Seen and discussed with Dr. Oakley.     Answers for HPI/ROS submitted by the patient on 7/9/2018   General Symptoms: No  Skin Symptoms: No  HENT Symptoms: No  EYE SYMPTOMS: No  HEART SYMPTOMS: No  LUNG SYMPTOMS: No  INTESTINAL SYMPTOMS: No  URINARY SYMPTOMS: No  GYNECOLOGIC SYMPTOMS: No  BREAST SYMPTOMS: No  SKELETAL SYMPTOMS: No  BLOOD SYMPTOMS: No  NERVOUS SYSTEM SYMPTOMS: No  MENTAL HEALTH SYMPTOMS: No  PHQ-2 Score: 0      Patient seen and examined with the resident.     Assesment:  1. 9 months following mpfl reconstruction - patella stable, excellent checkrein, no instability on exam. No reports of dislocations, sees benefit from patellar stabilization brace    The patient has been receiving steroid injections as well as Visco supplementation injections at home given her knee pain.  She reports these have not been helpful to her.    Plan: I long discussion today with lower at this time reviewed with her in detail the MRI as well as her physical exam.  At this time her knee Is stable on examination and she does not have apprehension with lateral translation, and has an excellent check rein.  I recognize that the MRI shows some discontinuity of the MPFL graft along its femoral origin, however I do not think that I can make her kneecap more stable than it is today with revision MPFL surgery.    My recommendation for her would be to maximize her nonsurgical management with anti-inflammatories, Tylenol, physical therapy for a home strengthening program.  I think she can realistically do this on her own.    I understand that she has been told that she has some chondrosis, certainly her overall her cartilage surfaces are maintained and she is not a candidate for an  arthroplasty (in my opinion).    At this time her goal be to continue her rehab, maximize nonsurgical benefit and see where he settles out.  Plan to see her again at the one-year anniversary of surgery.    I am going to try to create a link physical therapy appointment when she comes back to see me again at the one-year dario.    I agree with history, physical and imaging as well as the assessment and plan as detailed by Dr. Busby.         Again, thank you for allowing me to participate in the care of your patient.      Sincerely,    Paul Oakley MD

## 2018-07-16 NOTE — PROGRESS NOTES
Patient seen and examined with the resident.     Assesment:  1. 9 months following mpfl reconstruction - patella stable, excellent checkrein, no instability on exam. No reports of dislocations, sees benefit from patellar stabilization brace    The patient has been receiving steroid injections as well as Visco supplementation injections at home given her knee pain.  She reports these have not been helpful to her.    Plan: I long discussion today with lower at this time reviewed with her in detail the MRI as well as her physical exam.  At this time her knee Is stable on examination and she does not have apprehension with lateral translation, and has an excellent check rein.  I recognize that the MRI shows some discontinuity of the MPFL graft along its femoral origin, however I do not think that I can make her kneecap more stable than it is today with revision MPFL surgery.    My recommendation for her would be to maximize her nonsurgical management with anti-inflammatories, Tylenol, physical therapy for a home strengthening program.  I think she can realistically do this on her own.    I understand that she has been told that she has some chondrosis, certainly her overall her cartilage surfaces are maintained and she is not a candidate for an arthroplasty (in my opinion).    At this time her goal be to continue her rehab, maximize nonsurgical benefit and see where he settles out.  Plan to see her again at the one-year anniversary of surgery.    I am going to try to create a link physical therapy appointment when she comes back to see me again at the one-year dario.    I agree with history, physical and imaging as well as the assessment and plan as detailed by Dr. Busby.

## 2018-07-16 NOTE — NURSING NOTE
"Reason For Visit:   Chief Complaint   Patient presents with     Left Knee - Surgical Followup     DOS 10/31/17 Examination Under Anesthesia Left Knee, Left Knee Arthroscopy, Medial Patellofemoral Reconstruction with Allograft     She states that she had a cortisone shot in February 2018 and a viscous supplementation in June with no relief. New MRI on 7/5/18.      Date of surgery: 10/31/18  Type of surgery:   PROCEDURES:   1.  Examination under anesthesia, left knee.   2.  Left knee arthroscopy.   3.  Arthroscopic loose body removal of chondral debris.   4.  Chondroplasty of lateral tibial plateau.   5.  Medial patellofemoral ligament reconstruction with allograft.   6.  Medial imbrication.    Smoker: No  Request smoking cessation information: No    Pain Assessment  Patient Currently in Pain: Yes  0-10 Pain Scale: 5  Primary Pain Location: Knee  Pain Orientation: Left    Ht 1.651 m (5' 5\")  Wt 60.3 kg (133 lb)  BMI 22.13 kg/m2         Allergies   Allergen Reactions     Cefzil [Cefprozil]      Zithromax [Azithromycin]        Current Outpatient Prescriptions   Medication     albuterol (ALBUTEROL) 108 (90 BASE) MCG/ACT inhaler     AMLODIPINE BESYLATE PO     citalopram (CELEXA) 40 MG tablet     ethynodiol-ethinyl estradiol (ZOVIA 1/35E, 28,) 1-35 MG-MCG per tablet     IBUPROFEN PO     MELATONIN PO     senna-docusate (SENOKOT-S;PERICOLACE) 8.6-50 MG per tablet     traZODone (DESYREL) 50 MG tablet     No current facility-administered medications for this visit.            Jayleen Nixon, ATC    "

## 2018-07-16 NOTE — MR AVS SNAPSHOT
"              After Visit Summary   7/16/2018    Claudia Muñoz    MRN: [id number]           Patient Information     Date Of Birth          1985        Visit Information        Provider Department      7/16/2018 10:30 AM [facility]        Today's Diagnoses     Patellar instability of left knee    -  1       Follow-ups after your visit        Who to contact     Please call your clinic at [phone] to:    Ask questions about your health    Make or cancel appointments    Discuss your medicines    Learn about your test results    Speak to your doctor            Additional Information About Your Visit        MyChart Information     [employer] gives you secure access to your electronic health record. If you see a primary care provider, you can also send messages to your care team and make appointments. If you have questions, please call your primary care clinic.  If you do not have a primary care provider, please call [phone] and they will assist you.      [employer] is an electronic gateway that provides easy, online access to your medical records. With [employer], you can request a clinic appointment, read your test results, renew a prescription or communicate with your care team.     To access your existing account, please contact your [facility] or call [phone] for assistance.        Care EveryWhere ID     This is your Care EveryWhere ID. This could be used by other organizations to access your [place] medical records  [id number]        Your Vitals Were     Height BMI (Body Mass Index)                1.651 m (5' 5\") 22.13 kg/m2           Blood Pressure from Last 3 Encounters:   10/31/17 94/59   04/17/15 117/79    Weight from Last 3 Encounters:   07/16/18 60.3 kg (133 lb)   12/11/17 68.7 kg (151 lb 6.4 oz)   10/31/17 68 kg (150 lb)              Today, you had the following     No orders found for display       Primary Care Provider " Office Phone # Fax #    Sagar Bolanos [facility] [phone]       [address] [facility] [address]        Equal Access to Services     FAUSTO DAVILA : Sarah casarez jose Sonellali, wamarielda luqadaha, qaluis manuelta kaalmada yamilka, wilmer foster laNolanzafar siegel. So [facility] [phone].    ATENCIÓN: Si habla español, tiene a azul disposición servicios gratuitos de asistencia lingüística. Llame al [phone].    We comply with applicable federal civil rights laws and [place] laws. We do not discriminate on the basis of race, color, national origin, age, disability, sex, sexual orientation, or gender identity.            Thank you!     Thank you for choosing [facility]  for your care. Our goal is always to provide you with excellent care. Hearing back from our patients is one way we can continue to improve our services. Please take a few minutes to complete the written survey that you may receive in the mail after your visit with us. Thank you!             Your Updated Medication List - Protect others around you: Learn how to safely use, store and throw away your medicines at www.disposemymeds.org.          This list is accurate as of 7/16/18 11:23 AM.  Always use your most recent med list.                   Brand Name Dispense Instructions for use Diagnosis    AMLODIPINE BESYLATE PO      Take 10 mg by mouth Take daily in the winter for Raynaud's        citalopram 40 MG tablet    celeXA          IBUPROFEN PO      Take 400 mg by mouth daily as needed for moderate pain        MELATONIN PO      Take 10 mg by mouth At Bedtime        PROAIR  (90 Base) MCG/ACT Inhaler   Generic drug:  albuterol      Inhale 2 puffs into the lungs every 4 hours as needed for shortness of breath / dyspnea or wheezing        senna-docusate 8.6-50 MG per tablet    SENOKOT-S;PERICOLACE    30 tablet    Take 1-2 tablets by mouth 2 times daily Take while on oral narcotics to prevent or treat  constipation.    Patellar instability of left knee       traZODone 50 MG tablet    DESYREL     Take 50 mg by mouth nightly as needed        ZOVIA 1/35E (28) 1-35 MG-MCG per tablet   Generic drug:  ethynodiol-ethinyl estradiol      Take 1 tablet by mouth At Bedtime

## 2018-08-21 ENCOUNTER — TELEPHONE (OUTPATIENT)
Dept: ORTHOPEDICS | Facility: CLINIC | Age: 33
End: 2018-08-21

## 2018-08-21 NOTE — TELEPHONE ENCOUNTER
[facility] Call Center    Phone Message    May a detailed message be left on voicemail: yes    Reason for Call: Other: Clinic calling to see if Dr Dong will see this pt? she had a MPFL reconstruction and doesn't want to go back to Dr Oakley. Please call Margo back at [facility] to let her know if pt can be seen by Dr Dong or not.     Action Taken: Message routed to:  [facility] Orthopedics

## 2018-08-22 NOTE — TELEPHONE ENCOUNTER
Margo from [facility] called back.  Dr. Dong is open to seeing this patient.  We would want last clinic notes from [facility] and the main reason for being seen.    Patient would need to see PT prior to appt with Dr. Dong.  This can be done through myself or Brenna Hopkins.

## 2018-09-10 ENCOUNTER — TELEPHONE (OUTPATIENT)
Dept: ORTHOPEDICS | Facility: CLINIC | Age: 33
End: 2018-09-10

## 2018-09-10 NOTE — TELEPHONE ENCOUNTER
Called patient and left message. Spoke with ALYSON Randle for Dr. Dong about phone opinion. Will consult with Dr. Dong on Tuesday 9/11/18 about being seen in person or not. Dr. Dogn nursing staff will call pt after decision has been made.    Fritz Graff ATC

## 2018-09-10 NOTE — TELEPHONE ENCOUNTER
[facility] Call Center    Phone Message    May a detailed message be left on voicemail: yes    Reason for Call: Other: Pt wants to know if Dr Dong can just give opinion over the phone so pt doesn't have to drive 5 hours     Action Taken: Message routed to:  [facility] Ortho Clinic

## 2018-09-12 NOTE — TELEPHONE ENCOUNTER
Returned patient's call. Dr. Dong was given patient's information yesterday and she was trying to get into contact with orthopedic surgeon up by patient prior to making a decision about patient being seen by her in clinic or giving opinion over the phone.  We will contact her once Dr. Dong has reviewed everything and made a decision.

## 2018-09-21 ENCOUNTER — PRE VISIT (OUTPATIENT)
Dept: ORTHOPEDICS | Facility: CLINIC | Age: 33
End: 2018-09-21

## 2018-09-21 NOTE — TELEPHONE ENCOUNTER
FUTURE VISIT INFORMATION      FUTURE VISIT INFORMATION:    Date: 9/25    Time: 10:0    [place] [facility] [place]  REFERRAL INFORMATION:    Referring provider:      Referring providers clinic:      Reason for visit/diagnosis  L Knee F/U, check up reconstruction of MFPL    RECORDS REQUESTED FROM:       Clinic name Comments Records Status Imaging Status   [facility]  In [facility] In pacs                                   RECORDS STATUS

## 2018-09-23 ASSESSMENT — ENCOUNTER SYMPTOMS
MUSCLE CRAMPS: 0
BACK PAIN: 0
STIFFNESS: 1
MYALGIAS: 0
MUSCLE WEAKNESS: 0
NECK PAIN: 0
JOINT SWELLING: 1
ARTHRALGIAS: 1

## 2018-09-23 ASSESSMENT — KOOS JR: HOW SEVERE IS YOUR KNEE STIFFNESS AFTER FIRST WAKING IN MORNING: MILD

## 2018-09-23 ASSESSMENT — ACTIVITIES OF DAILY LIVING (ADL): FUNCTION,_DAILY_LIVING_SCORE: 89.7

## 2018-09-24 DIAGNOSIS — G89.29 CHRONIC PAIN OF LEFT KNEE: Primary | ICD-10-CM

## 2018-09-24 DIAGNOSIS — M25.562 CHRONIC PAIN OF LEFT KNEE: Primary | ICD-10-CM

## 2018-09-25 ENCOUNTER — RADIANT APPOINTMENT (OUTPATIENT)
Dept: GENERAL RADIOLOGY | Facility: CLINIC | Age: 33
End: 2018-09-25
Attending: ORTHOPAEDIC SURGERY
Payer: COMMERCIAL

## 2018-09-25 ENCOUNTER — OFFICE VISIT (OUTPATIENT)
Dept: ORTHOPEDICS | Facility: CLINIC | Age: 33
End: 2018-09-25
Payer: COMMERCIAL

## 2018-09-25 ENCOUNTER — THERAPY VISIT (OUTPATIENT)
Dept: PHYSICAL THERAPY | Facility: CLINIC | Age: 33
End: 2018-09-25
Payer: COMMERCIAL

## 2018-09-25 VITALS — HEIGHT: 61 IN | BODY MASS INDEX: 26.04 KG/M2 | WEIGHT: 137.9 LBS

## 2018-09-25 DIAGNOSIS — M25.562 LEFT KNEE PAIN, UNSPECIFIED CHRONICITY: ICD-10-CM

## 2018-09-25 DIAGNOSIS — Z98.890 S/P KNEE SURGERY: ICD-10-CM

## 2018-09-25 DIAGNOSIS — M25.562 CHRONIC PAIN OF LEFT KNEE: ICD-10-CM

## 2018-09-25 DIAGNOSIS — Z98.890 S/P LEFT KNEE SURGERY: Primary | ICD-10-CM

## 2018-09-25 DIAGNOSIS — G89.29 CHRONIC PAIN OF LEFT KNEE: ICD-10-CM

## 2018-09-25 PROCEDURE — 97110 THERAPEUTIC EXERCISES: CPT | Mod: GP | Performed by: PHYSICAL THERAPIST

## 2018-09-25 PROCEDURE — 97530 THERAPEUTIC ACTIVITIES: CPT | Mod: GP | Performed by: PHYSICAL THERAPIST

## 2018-09-25 PROCEDURE — 97161 PT EVAL LOW COMPLEX 20 MIN: CPT | Mod: GP | Performed by: PHYSICAL THERAPIST

## 2018-09-25 RX ORDER — PREGABALIN 50 MG/1
50 CAPSULE ORAL 3 TIMES DAILY
Qty: 90 CAPSULE | Refills: 1 | Status: SHIPPED | OUTPATIENT
Start: 2018-09-25 | End: 2018-10-04 | Stop reason: SINTOL

## 2018-09-25 RX ORDER — NIFEDIPINE 30 MG
30 TABLET, EXTENDED RELEASE ORAL DAILY
COMMUNITY

## 2018-09-25 NOTE — PROGRESS NOTES
PT Responsive Factors    Proximal LE/Trunk muscle weakness +   Quadriceps muscle dysfunction/weakness +   Poor postural stability +   Poor dynamic movement patterns +   Restricted ankle DF +   Inconsistent/non-specific PT intervention -   TOTAL 5/6     Non-PT Responsive Factors    Abnormal static patellar orientation +   Abnormal dynamic patellar tracking +   Abnormal LE bony alignment +   Global hypermobility/laxity -   TOTAL 3/4     Response to taping trial:   Initial step up/down: 4/10 pain up, 6/10 pain down - has a clunk (patient reports giving way) at the top of the step  Lateral>medial glide: 2/10 up. 4-5/10 down. Subjective improvement in stability as well.  Lateral>medial glide with tilt correction: up 0-1/10, down 2/10.   Tilt+glide+patellar unload: 0/10 for up/down (with tape techniques - no clunk or feelings of giving way)  Medial to lateral glide: worse pain with both ascend/descend with increased instability    Claudia presents to physical therapy with primary complaint of pain. She has good core and hip strength and has continued her previous physical therapy exercises. She has poor movement patterns and reported pain with all tests performed in a loaded position. She has a positive response to tape and her pain with step up/down was eliminated with a glide+tilt+soft tissue unloading technique. She does have suspicions for bony alignment involvement.         Physical Therapy Initial Evaluation: Subjective History    History of Presenting Complaint:  Presenting Complaint: LEFT knee pain (onset of pain after February)   Primary Symptoms: primarily pain (location of pain: Medial patella - does not extend outside a small space, nor does it extend into the quadriceps or the calf) and reduced sense of instability, constant swelling in the knee - does not flucuate    Details of most recent instability episode: Date: 1 week ago, Knee: Left, Mechanism: was not wearing a brace, just was walking  Did you fall?:  "no  Mechanism of patellar reduction: spontaneous reduction  Joint reaction to event: no change in baseline swelling and pain  Did you seek medical care/how soon/what kind?: Saw her surgeon who Saurav taped her knee    Details of primary dislocation: ~[age] years old, Knee: Left, Mechanism: (No issues with the right)  Did you fall?: No  Mechanism of patellar reduction: spontaneous reduction  Joint reaction to event: Moderate swelling and pain  Did you seek medical care/how soon/what kind?: No    Instability behavior between 1st event and most recent event: Has increased and is back to baseline that it was prior to surgery with Dr. Oakley    Current frequency of instability events:   Subluxation events: Multiple times/day when not wearing the brace  Roddy dislocation events: 0 times/year    Compared to past: more often        Prior Tests for Current Complaint: x-ray and MRI  Prior Treatment for Current Complaint: PT , Injections: steroid + synvisc (Not helpful), Brace, Patellar taping: Yes and Surgery(ies): See below.     Symptom Behavior & Functional Limitations:   Constant or Activity/Position Dependent?: constant low grade pain, increased pain levels with activity.   Time of day dependent?: no    Change in symptoms since onset: worsening  Worst pain = 10/10 with Dislocation event.    Symptom Aggravation/Functional Limitations (due to current complaint):   3 worst activities: Kneeling, Squatting, Stairs  Best pain = 5/10.  Symptom Relief: Brace.      Time frame referenced for prior level of function: Prior to symptom onset/injury date noted above.      Lifestyle & General Medical History:  Employment: [employer] .    General Physical Activity Level (within past year): Walking (the dog) - running is not an option - reports she was clumsy as a kid.    General health status (as reported by patient): good.     Other orthopaedic history: 2003 - plica removal in B knees (no imaging prior to surgery). LRL, \"cleaned it " "out\", 4-5 overall. 2017 MPFL .     Lower Extremity/Patellofemoral Exam    Dynamic Movement Screen:  2 leg stance: suspicion genu valgum bilaterally (left more than right), decreased arch height bilaterally,     1 leg stance:   Right: proprioceptive challenge and excessive lateral trunk lean over stance limb, femoral internal rotation  Left: proprioceptive challenge and excessive lateral trunk lean over stance limb, femoral internal rotation    Squatting (double leg and single leg): not assessed today due to high levels of pain, in viewing patient's transfers and sit to stand transfer - note femoral IR/Add, excessive anterior knee translation.       Gait: decreased knee flexion L>R, shortened strides bilaterally, decreased trunk rotation and hip extension bilaterally    Functional Strength Tracking    Anterior Excursion Test Anterior Reach   Uninvolved Extremity 28 cm   Involved Extremity 28 cm   LSI% 100%   Post-tape application: 31cm on involved, 34cm on uninvolved (91% LSI).     Patellar taping:   Lateral to medial glide correction: Ascending step 4/10 pain pre, 2/10 post. Descending step 6/10 pre, 4-5/10 post. Subjective improvement in stability as well.  Lateral to medial glide with tilt correction: asc 0-1/10, desc 2/10.   Patellar unload: 0/10 for asc/desc  Medial to lateral glide: worse pain with both ascend/descend with increased instability    Patellofemoral Joint Special Testing:    Static Patellar Positioning in 90 degrees KF (Seated)  Right: mild lateral tilt and mild lateral translation  Left: mild lateral tilt and moderate lateral translation, patella karo visually observed    Patellar tracking with OKC knee extension (Seated)  Right: Increased lateralization into TKE       Left: Maintains lateralization into TKE with apprehension prior to terminal knee extension (with PT assistance into terminal extension provokes pain - unable to localize)    Static Patellar Positioning in full extension " (Supine)  Right: suspicion for version/torsion, mild tilt and mild lateral translation noted  Left: suspicion for version/torsion L>R, maintains lateral position of the patella    Patellar Quadrant Mobility Test (Med:Lat)  Right: 2:2 no apprehension       Left 2:3 with apprehension both medial/lateral  Bilaterally, consistent with hx of LRL, increased lateral border prominence/tilting possible.       Knee Joint AROM   Right Left Difference   Hyperextension 2 deg 2 deg 0 deg   Extension 0 deg 0 deg 0 deg   Flexion 140 deg 120 deg  (+) 20 deg     Basic Muscle Activation:  Quadriceps: Right: Depressed intensity, Left: Depressed intensity, co-contraction with the gluteals    Palpation:   Tender to palpation at the following structures: Inferior patellar pole, medial fat pad, medial patellar border, medial femoral condyle  NOT tender to palpation at the following structures: LCL, lateral joint line, medial joint line, superior patellar border    Quadriceps Muscle Volume Tracking  Circumferential  Thigh Msmts Right Left Difference   Joint line circumference 33.5 cm 33.5 cm 0 cm   5 cm proximal 36 cm 35 cm 1 cm   15 cm Proximal 45 cm 45 cm 0 cm   Quad atrophy visually observed on L over vastus medialis/lateralis when sitting edge of mat, knee at 90 degrees flexed.     Hip Joint PROM Screen   Right Left   ER 80 deg 80 deg   IR 40 deg 40 deg     Lower Extremity Muscle Strength (x/5)   Right Left   Hip ER 4+/5 4/5   Hip IR 4+/5 4/5   Hip ABD 4+/5 4+/5   Hip Ext 4+/5 4-/5     Lower Extremity Flexibility Screen:   Right Left   Gastroc/ Soleus + +       Assessment/Plan:  Patient is a 33 year old female with left side knee complaints.    Patient has the following significant findings with corresponding treatment plan.                Diagnosis 1:  Left knee pain post-MPFL  Pain -  hot/cold therapy, splint/taping/bracing/orthotics, self management and education  Decreased ROM/flexibility - therapeutic exercise, therapeutic activity  and home program  Impaired balance - therapeutic activities and home program  Decreased proprioception - neuro re-education, therapeutic activities and home program  Impaired muscle performance - neuro re-education and home program  Decreased function - therapeutic activities and home program    Therapy Evaluation Codes:   1) History comprised of:   Personal factors that impact the plan of care:      Age, Overall behavior pattern and Time since onset of symptoms.    Comorbidity factors that impact the plan of care are:      Weakness.     Medications impacting care: None.  2) Examination of Body Systems comprised of:   Body structures and functions that impact the plan of care:      Knee.   Activity limitations that impact the plan of care are:      Sitting, Sports, Squatting/kneeling, Stairs, Standing and Walking.  3) Clinical presentation characteristics are:   Stable/Uncomplicated.  4) Decision-Making    Low complexity using standardized patient assessment instrument and/or measureable assessment of functional outcome.  Cumulative Therapy Evaluation is: Low complexity.    Previous and current functional limitations:  (See Goal Flow Sheet for this information)    Short term and Long term goals: (See Goal Flow Sheet for this information)     Communication ability:  Patient appears to be able to clearly communicate and understand verbal and written communication and follow directions correctly.  Treatment Explanation - The following has been discussed with the patient:   RX ordered/plan of care  Anticipated outcomes  Possible risks and side effects  This patient would benefit from PT intervention to resume normal activities.   Rehab potential is good.    Frequency:  1 X week, once daily  Duration:  for 1 weeks  Discharge Plan:  Achieve all LTG.  Independent in home treatment program.  Reach maximal therapeutic benefit.    Please refer to the daily flowsheet for treatment today, total treatment time and time spent  performing 1:1 timed codes.

## 2018-09-25 NOTE — LETTER
9/25/2018       RE: Claudia Muñoz  [address] [place] [id number]     Dear Colleague,    Thank you for referring your patient, Claudia Muñoz, to [facility]. Please see a copy of my visit note below.    Chief complaint: Bilateral patellar instability    HPI: Claudia is a 33-year-old female who was referred from Barrera De La O MD; for consultation regarding bilateral patellar instability.      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 The history of a florecita lateral dislocation has been vague.  She feels the initial surgery was for pain and was a plica excision, and it was this surgery that started the instability episodes.  She is pretty convinced her kneecap moves medially.      She has had several surgical procedures bilaterally to address this patellar instability.  First was an LRR which seems to help the RIGHT side. She has been very happy with her right knee, but continues to have instability of the left.      She most recently underwent MPF L reconstruction Dr. Oakley in October 2017.  She did not try taping before this surgery; she feels that her symptoms have not improved at all since this procedure.  She still feels that her knee dislocates medially particularly when it is in extension.      She recently has tried taping, and uses Kinesiotape or brace every day.  When she has this these dislocation events she endorses new swellingthat she can see but is never real large.  However she does state that she feels fullness and pressure even without swellling.      She takes Tylenol and very occasional tramadol.  She cannot tolerate NSAIDs due to a history of kidney disease.    She has remained a full time worker at [employer]    Left:  2003 left knee plica debridement by Roque Yan  2005 left lateral retinacular release by Yuri Yan  Approximately 2007 left knee arthroscopy by Yuri  Yuriy  10/31/2017 left MPFL reconstruction with allograft by Dr. 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  Also history of bilateral shoulder arthroscopic procedures, left biceps repair versus tenodesis, and right ankle surgery for multiple sprains.      Family history: History of asthma and rheumatoid arthritis    Physical exam: Petite female.   focused  exam of left knee with well-healed incisions. no erythema.  No effusion   (+) Atrophy of quadricep muscle.  Range of motion 0-125 degrees of flexion.  No J sign.  1+ quadrants of lateral patellar mobility with firm endpoint and no apprehension.  3 quadrant medial mobility.  Very (+) medial patella tilt test with lateral border of the kneecap inverting to the vertical position.    TF joint: Stable with varus and valgus stress.  Stable with ACL and PCL stress.    Focus exam of right knee reveals range of motion 0-135 degrees.  Negative J sign. 2+ quadrants of lateral patellar mobility with firm endpoint and no apprehension.  3 quadrant medial mobility.  Very (+) medial patella tilt test with lateral border of the kneecap inverting to the vertical position.     Imaging: Long-leg standing films obtained today show neutral weightbearing axis on the right side and valgus weightbearing axis on the left with the weightbearing axis passing through the lateral one third of the joint.  Joint spaces appear well-preserved    Axial view of the bilateral knees shows well-preserved patellofemoral joint space with congruity of the joint.  No evidence of patellar tilt.    MRI of the left knee reviewed which shows well-preserved cartilage in all 3 compartments.  MPFL consistent with reconstruction.  No other  ligamentous or meniscal injury identified.    S/P wide LRR but failed to respond to reverse Mg taping; in fact that caused more pain.    Assessment: sensation of left knee instability refractory to multiple operative procedures with clinical evidence of quadricep weakness.    Suspect that sensation of instability is twofold.  1) lack of quad strength is a component 2) a component of CRPS.    Therefore would recommend continue physical therapy to work on quad strengthening.    We expect that a kneehab brace will be beneficial for the patient.      Patient has the following justifications for Kneehab  -Disuse atrophy of the quadriceps muscle  -Intact nerve supply to the quadricepts muscle  -Physical therapy alone is not sufficient to treat disuse atrophy, proven by history of PT, today's PT evaluation, and home strengthening regimen  -Large treatment area with multiple sites requires us of conductive garment      We also recommend starting the patient on gabapentin.  We will follow-up with the patient through [facility] and schedule appropriate follow-up visit as needed.  RT: 25 min, CT: 20 min.    Juliet Mahmood MD  Orthopaedic Resident, PGY4    I have personally examined this patient and have reviewed the clinical presentation and progress note with the resident.  I agree with the treatment plan as outlined.  The plan was formulated with the resident on the day of the resident dictation.    Mary Ann Dong    CC: Barrera Gary MD; [place], [place]

## 2018-09-25 NOTE — MR AVS SNAPSHOT
After Visit Summary   9/25/2018    Claudia Muñoz    MRN: [id number]           Patient Information     Date Of Birth          1985        Visit Information        Provider Department      9/25/2018 7:40 AM Yue Martines, PT [facility] Physical [facility]        Today's Diagnoses     Left knee pain, unspecified chronicity           Follow-ups after your visit        Future tests that were ordered for you today     Open Future Orders        Priority Expected Expires Ordered    PHYSICAL THERAPY REFERRAL (External-Prints) Routine  9/25/2019 9/25/2018    PHYSICAL THERAPY REFERRAL (Internal) Routine  9/24/2019 9/24/2018            Who to contact     If you have questions or need follow up information about today's clinic visit or your schedule please contact [facility] PHYSICAL THERAPY [facility] directly at [phone].  Normal or non-critical lab and imaging results will be communicated to you by MyChart, letter or phone within 4 business days after the clinic has received the results. If you do not hear from us within 7 days, please contact the clinic through [employer] or phone. If you have a critical or abnormal lab result, we will notify you by phone as soon as possible.  Submit refill requests through My [employer] or call your pharmacy and they will forward the refill request to us. Please allow 3 business days for your refill to be completed.          Additional Information About Your Visit        MyChart Information     My Digital Shield gives you secure access to your electronic health record. If you see a primary care provider, you can also send messages to your care team and make appointments. If you have questions, please call your primary care clinic.  If you do not have a primary care provider, please call [phone] and they will assist you.        Care EveryWhere ID     This is your Care EveryWhere ID. This could be used by other organizations to access your [facility] medical records  [id number]         Blood  Pressure from Last 3 Encounters:   10/31/17 94/59   04/17/15 117/79    Weight from Last 3 Encounters:   09/25/18 62.6 kg (137 lb 14.4 oz)   07/16/18 60.3 kg (133 lb)   12/11/17 68.7 kg (151 lb 6.4 oz)              We Performed the Following     [facility] PT EVAL, LOW COMPLEXITY     [facility] INITIAL EVAL REPORT     THERAPEUTIC ACTIVITIES     THERAPEUTIC EXERCISES          Today's Medication Changes          These changes are accurate as of 9/25/18  5:23 PM.  If you have any questions, ask your nurse or doctor.               Start taking these medicines.        Dose/Directions    pregabalin 50 MG capsule   Commonly known as:  LYRICA   Used for:  S/P left knee surgery   Started by:  Mary Ann Dong MD        Dose:  50 mg   Take 1 capsule (50 mg) by mouth 3 times daily Take 50mg one daily for one week, then 50mg twice daily for one week then take a max dose of 50mg three times daily.   Quantity:  90 capsule   Refills:  1            Where to get your medicines      Some of these will need a paper prescription and others can be bought over the counter.  Ask your nurse if you have questions.     Bring a paper prescription for each of these medications     pregabalin 50 MG capsule                Primary Care Provider Office Phone # Fax #    [facility] [phone]       [address] [place] [id number]        Equal Access to Services     FAUSTO DAVILA AH: Sarah casarez hadasho Soomaali, waaxda luqadaha, qaybta kaalmada adetoniyada, wilmer siegel. So [facility] [phone].    ATENCIÓN: Si habla español, tiene a azul disposición servicios gratuitos de asistencia lingüística. Llame al [phone].    We comply with applicable federal civil rights laws and [place] laws. We do not discriminate on the basis of race, color, national origin, age, disability, sex, sexual orientation, or gender identity.            Thank you!     Thank you for choosing [facility] PHYSICAL THERAPY [facility]  for  your care. Our goal is always to provide you with excellent care. Hearing back from our patients is one way we can continue to improve our services. Please take a few minutes to complete the written survey that you may receive in the mail after your visit with us. Thank you!             Your Updated Medication List - Protect others around you: Learn how to safely use, store and throw away your medicines at www.disposemymeds.org.          This list is accurate as of 9/25/18  5:23 PM.  Always use your most recent med list.                   Brand Name Dispense Instructions for use Diagnosis    AMLODIPINE BESYLATE PO      Take 10 mg by mouth Take daily in the winter for Raynaud's        citalopram 40 MG tablet    celeXA          IBUPROFEN PO      Take 400 mg by mouth daily as needed for moderate pain        MELATONIN PO      Take 10 mg by mouth At Bedtime        NIFEdipine ER 30 MG Tb24    ADALAT CC     Take 30 mg by mouth daily        pregabalin 50 MG capsule    LYRICA    90 capsule    Take 1 capsule (50 mg) by mouth 3 times daily Take 50mg one daily for one week, then 50mg twice daily for one week then take a max dose of 50mg three times daily.    S/P left knee surgery       PROAIR  (90 Base) MCG/ACT inhaler   Generic drug:  albuterol      Inhale 2 puffs into the lungs every 4 hours as needed for shortness of breath / dyspnea or wheezing        senna-docusate 8.6-50 MG per tablet    SENOKOT-S;PERICOLACE    30 tablet    Take 1-2 tablets by mouth 2 times daily Take while on oral narcotics to prevent or treat constipation.    Patellar instability of left knee       traZODone 50 MG tablet    DESYREL     Take 50 mg by mouth nightly as needed        ZOVIA 1/35E (28) 1-35 MG-MCG per tablet   Generic drug:  ethynodiol-ethinyl estradiol      Take 1 tablet by mouth At Bedtime

## 2018-09-25 NOTE — NURSING NOTE
"Reason For Visit:   Chief Complaint   Patient presents with     Eval/Assessment     f/u left knee and reconstruction of MFPL w/ Dr. Oakley DOS:10/31/17.       Primary MD: Sagar Goodman Jill  Referring MD: Barrera Alcala    ?  No    Occupation: HR at [employer].  Currently working? Yes.  Work status?  Full time.    Date of injury: None  Type of injury: Overtime.    Date of surgery: 10/31/2017  Type of surgery:  1.  Examination under anesthesia, left knee.   2.  Left knee arthroscopy.   3.  Arthroscopic loose body removal of chondral debris.   4.  Chondroplasty of lateral tibial plateau.   5.  Medial patellofemoral ligament reconstruction with allograft.   6.  Medial imbrication.     Smoker: No    Ht 1.537 m (5' 0.5\")  Wt 62.6 kg (137 lb 14.4 oz)  BMI 26.49 kg/m2    Pain Assessment  Patient Currently in Pain: Yes  0-10 Pain Scale: 5  Primary Pain Location: Knee  Pain Orientation: Left  Pain Descriptors: Aching, Throbbing  Alleviating Factors: Ice, Heat, Pain medication, Rest  Aggravating Factors: Stairs, Squatting, Bending, Kneeling    Salena Brand CMA  9/25/2018      "

## 2018-09-25 NOTE — MR AVS SNAPSHOT
After Visit Summary   9/25/2018    Claudia Muñoz    MRN: [id number]           Patient Information     Date Of Birth          1985        Visit Information        Provider Department      9/25/2018 10:00 AM Mary Ann Dong [facility]        Today's Diagnoses     S/P left knee surgery    -  1    S/P knee surgery           Follow-ups after your visit        Additional Services     PHYSICAL THERAPY REFERRAL (External-Prints)       Physical Therapy Referral    Knee Hab has been ordered please have the patient bring this to her physical therapy appointments                  Future tests that were ordered for you today     Open Future Orders        Priority Expected Expires Ordered    PHYSICAL THERAPY REFERRAL (External-Prints) Routine  9/25/2019 9/25/2018    PHYSICAL THERAPY REFERRAL (Internal) Routine  9/24/2019 9/24/2018            Who to contact     Please call your clinic at [phone] to:    Ask questions about your health    Make or cancel appointments    Discuss your medicines    Learn about your test results    Speak to your doctor            Additional Information About Your Visit        MyChart Information     [employer] gives you secure access to your electronic health record. If you see a primary care provider, you can also send messages to your care team and make appointments. If you have questions, please call your primary care clinic.  If you do not have a primary care provider, please call [phone] and they will assist you.      [employer] is an electronic gateway that provides easy, online access to your medical records. With [employer], you can request a clinic appointment, read your test results, renew a prescription or communicate with your care team.     To access your existing account, please contact your [facility] or call [phone] for assistance.        Care EveryWhere ID     This is your Care EveryWhere ID. This could be used  "by other organizations to access your [facility] medical records  [id number]        Your Vitals Were     Height BMI (Body Mass Index)                5' 0.5\" (1.537 m) 26.49 kg/m2           Blood Pressure from Last 3 Encounters:   10/31/17 94/59   04/17/15 117/79    Weight from Last 3 Encounters:   09/25/18 137 lb 14.4 oz (62.6 kg)   07/16/18 133 lb (60.3 kg)   12/11/17 151 lb 6.4 oz (68.7 kg)                 Today's Medication Changes          These changes are accurate as of 9/25/18  7:09 PM.  If you have any questions, ask your nurse or doctor.               Start taking these medicines.        Dose/Directions    pregabalin 50 MG capsule   Commonly known as:  LYRICA   Used for:  S/P left knee surgery   Started by:  Mary Ann Dong MD        Dose:  50 mg   Take 1 capsule (50 mg) by mouth 3 times daily Take 50mg one daily for one week, then 50mg twice daily for one week then take a max dose of 50mg three times daily.   Quantity:  90 capsule   Refills:  1            Where to get your medicines      Some of these will need a paper prescription and others can be bought over the counter.  Ask your nurse if you have questions.     Bring a paper prescription for each of these medications     pregabalin 50 MG capsule                Primary Care Provider Office Phone # Fax #    [facility] [phone]       [address] [id number]        Equal Access to Services     FAUSTO DAVILA AH: Hadii grzegorz lópezo Sogaurang, waaxda luqadaha, qaybta kaalmada yamilka, wax leni vidal [facility] sie[facility]. So [facility] [phone].    ATENCIÓN: Si habla español, tiene a azul disposición servicios gratuitos de asistencia lingüística. Llame al [phone].    We comply with applicable federal civil rights laws and [place] laws. We do not discriminate on the basis of race, color, national origin, age, disability, sex, sexual orientation, or gender identity.            Thank you!     Thank " you for choosing [facility]  for your care. Our goal is always to provide you with excellent care. Hearing back from our patients is one way we can continue to improve our services. Please take a few minutes to complete the written survey that you may receive in the mail after your visit with us. Thank you!             Your Updated Medication List - Protect others around you: Learn how to safely use, store and throw away your medicines at www.disposemymeds.org.          This list is accurate as of 9/25/18  7:09 PM.  Always use your most recent med list.                   Brand Name Dispense Instructions for use Diagnosis    AMLODIPINE BESYLATE PO      Take 10 mg by mouth Take daily in the winter for Raynaud's        citalopram 40 MG tablet    celeXA          IBUPROFEN PO      Take 400 mg by mouth daily as needed for moderate pain        MELATONIN PO      Take 10 mg by mouth At Bedtime        NIFEdipine ER 30 MG Tb24    ADALAT CC     Take 30 mg by mouth daily        pregabalin 50 MG capsule    LYRICA    90 capsule    Take 1 capsule (50 mg) by mouth 3 times daily Take 50mg one daily for one week, then 50mg twice daily for one week then take a max dose of 50mg three times daily.    S/P left knee surgery       PROAIR  (90 Base) MCG/ACT inhaler   Generic drug:  albuterol      Inhale 2 puffs into the lungs every 4 hours as needed for shortness of breath / dyspnea or wheezing        senna-docusate 8.6-50 MG per tablet    SENOKOT-S;PERICOLACE    30 tablet    Take 1-2 tablets by mouth 2 times daily Take while on oral narcotics to prevent or treat constipation.    Patellar instability of left knee       traZODone 50 MG tablet    DESYREL     Take 50 mg by mouth nightly as needed        ZOVIA 1/35E (28) 1-35 MG-MCG per tablet   Generic drug:  ethynodiol-ethinyl estradiol      Take 1 tablet by mouth At Bedtime

## 2018-09-25 NOTE — PROGRESS NOTES
Chief complaint: Bilateral patellar instability    HPI: Claudia is a 33-year-old female who was referred from Barrera De La O MD; for consultation regarding bilateral patellar instability.      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 The history of a florecita lateral dislocation has been vague.  She feels the initial surgery was for pain and was a plica excision, and it was this surgery that started the instability episodes.  She is pretty convinced her kneecap moves medially.      She has had several surgical procedures bilaterally to address this patellar instability.  First was an LRR which seems to help the RIGHT side. She has been very happy with her right knee, but continues to have instability of the left.      She most recently underwent MPF L reconstruction Dr. Oakley in October 2017.  She did not try taping before this surgery; she feels that her symptoms have not improved at all since this procedure.  She still feels that her knee dislocates medially particularly when it is in extension.      She recently has tried taping, and uses Kinesiotape or brace every day.  When she has this these dislocation events she endorses new swellingthat she can see but is never real large.  However she does state that she feels fullness and pressure even without swellling.      She takes Tylenol and very occasional tramadol.  She cannot tolerate NSAIDs due to a history of kidney disease.    She has remained a full time worker at [employer]    Left:  2003 left knee plica debridement by Roque Yan  2005 left lateral retinacular release by Yuri Yan  Approximately 2007 left knee arthroscopy by Yuri Yan  10/31/2017 left MPFL reconstruction with allograft by Dr. 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  Also history of bilateral shoulder arthroscopic procedures, left biceps repair versus tenodesis, and right ankle surgery for multiple sprains.      Family history: History of asthma and rheumatoid arthritis    Physical exam: Petite female.   focused  exam of left knee with well-healed incisions. no erythema.  No effusion   (+) Atrophy of quadricep muscle.  Range of motion 0-125 degrees of flexion.  No J sign.  1+ quadrants of lateral patellar mobility with firm endpoint and no apprehension.  3 quadrant medial mobility.  Very (+) medial patella tilt test with lateral border of the kneecap inverting to the vertical position.    TF joint: Stable with varus and valgus stress.  Stable with ACL and PCL stress.    Focus exam of right knee reveals range of motion 0-135 degrees.  Negative J sign. 2+ quadrants of lateral patellar mobility with firm endpoint and no apprehension.  3 quadrant medial mobility.  Very (+) medial patella tilt test with lateral border of the kneecap inverting to the vertical position.     Imaging: Long-leg standing films obtained today show neutral weightbearing axis on the right side and valgus weightbearing axis on the left with the weightbearing axis passing through the lateral one third of the joint.  Joint spaces appear well-preserved    Axial view of the bilateral knees shows well-preserved patellofemoral joint space with congruity of the joint.  No evidence of patellar tilt.    MRI of the left knee reviewed which shows well-preserved cartilage in all 3 compartments.  MPFL consistent with reconstruction.  No other ligamentous or meniscal injury identified.    S/P wide LRR but failed to respond to reverse Mg taping; in fact that caused more pain.    Assessment: sensation of left knee instability refractory to multiple operative procedures with clinical evidence of quadricep  weakness.    Suspect that sensation of instability is twofold.  1) lack of quad strength is a component 2) a component of CRPS.    Therefore would recommend continue physical therapy to work on quad strengthening.    We expect that a kneehab brace will be beneficial for the patient.      Patient has the following justifications for Kneehab  -Disuse atrophy of the quadriceps muscle  -Intact nerve supply to the quadricepts muscle  -Physical therapy alone is not sufficient to treat disuse atrophy, proven by history of PT, today's PT evaluation, and home strengthening regimen  -Large treatment area with multiple sites requires us of conductive garment      We also recommend starting the patient on gabapentin.  We will follow-up with the patient through [facility] and schedule appropriate follow-up visit as needed.  RT: 25 min, CT: 20 min.    Juliet Mahmood MD  Orthopaedic Resident, PGY4    I have personally examined this patient and have reviewed the clinical presentation and progress note with the resident.  I agree with the treatment plan as outlined.  The plan was formulated with the resident on the day of the resident dictation.    Mary Ann Dong    CC: Barrera Gary MD; [place], [place]

## 2018-10-04 DIAGNOSIS — M25.562 CHRONIC PAIN OF LEFT KNEE: Primary | ICD-10-CM

## 2018-10-04 DIAGNOSIS — G89.29 CHRONIC PAIN OF LEFT KNEE: Primary | ICD-10-CM

## 2018-10-04 RX ORDER — GABAPENTIN 100 MG/1
CAPSULE ORAL
Qty: 90 CAPSULE | Refills: 1 | Status: SHIPPED | OUTPATIENT
Start: 2018-10-04

## 2019-11-04 ENCOUNTER — HEALTH MAINTENANCE LETTER (OUTPATIENT)
Age: 34
End: 2019-11-04

## 2020-11-22 ENCOUNTER — HEALTH MAINTENANCE LETTER (OUTPATIENT)
Age: 35
End: 2020-11-22

## 2021-09-19 ENCOUNTER — HEALTH MAINTENANCE LETTER (OUTPATIENT)
Age: 36
End: 2021-09-19

## 2021-12-29 NOTE — ANESTHESIA POSTPROCEDURE EVALUATION
Patient: Claudia Muñoz    Procedure(s):  Examination Under Anesthesia Left Knee, Left Knee Arthroscopy, Medial Patellofemoral Reconstruction with Allograft - Wound Class: I-Clean    Diagnosis:Patellar Instability  Diagnosis Additional Information: No value filed.    Anesthesia Type:  General, LMA, Peripheral Nerve Block    Note:  Anesthesia Post Evaluation    Patient location during evaluation: Phase 2  Patient participation: Able to fully participate in evaluation  Level of consciousness: awake and alert  Pain management: adequate  Airway patency: patent  Cardiovascular status: acceptable  Respiratory status: acceptable  Hydration status: acceptable  PONV: none     Anesthetic complications: None          Last vitals:  Vitals:    10/31/17 1010 10/31/17 1015 10/31/17 1036   BP: 103/67 97/61 94/59   Pulse:      Resp: 17 16 16   Temp: 36.7  C (98  F) 36.7  C (98  F) 36.4  C (97.6  F)   SpO2: 98% 98% 98%         Electronically Signed By: Jody Jackson MD  October 31, 2017  11:54 AM   Statement Selected

## 2022-01-08 ENCOUNTER — HEALTH MAINTENANCE LETTER (OUTPATIENT)
Age: 37
End: 2022-01-08

## 2022-11-20 ENCOUNTER — HEALTH MAINTENANCE LETTER (OUTPATIENT)
Age: 37
End: 2022-11-20

## 2023-04-15 ENCOUNTER — HEALTH MAINTENANCE LETTER (OUTPATIENT)
Age: 38
End: 2023-04-15

## 2025-08-23 ENCOUNTER — HEALTH MAINTENANCE LETTER (OUTPATIENT)
Age: 40
End: 2025-08-23

## 2025-09-04 ENCOUNTER — TRANSCRIBE ORDERS (OUTPATIENT)
Dept: OTHER | Age: 40
End: 2025-09-04

## 2025-09-04 ENCOUNTER — DOCUMENTATION ONLY (OUTPATIENT)
Dept: GASTROENTEROLOGY | Facility: CLINIC | Age: 40
End: 2025-09-04
Payer: COMMERCIAL

## 2025-09-04 DIAGNOSIS — K52.832 LYMPHOCYTIC COLITIS: Primary | ICD-10-CM

## (undated) DEVICE — DRAPE C-ARMOR 5 SIDED 5523

## (undated) DEVICE — DRAPE C-ARM W/STRAPS 42X72" 07-CA104

## (undated) DEVICE — ABLATOR ARTHREX APOLLO RF MP90 ASPIRATING 90DEG AR-9811

## (undated) DEVICE — TUBING SYSTEM ARTHREX PATIENT REDEUCE AR-6421

## (undated) DEVICE — PAD ARMBOARD FOAM EGGCRATE COVIDEN 3114367

## (undated) DEVICE — GLOVE PROTEXIS BLUE W/NEU-THERA 8.0  2D73EB80

## (undated) DEVICE — Device

## (undated) DEVICE — LINEN ORTHO PACK 5446

## (undated) DEVICE — SU NDL MCGOWAN 1/2 1859-6D

## (undated) DEVICE — BLADE KNIFE SURG 15 371115

## (undated) DEVICE — SOL NACL 0.9% IRRIG 3000ML BAG 2B7477

## (undated) DEVICE — PREP DURAPREP 26ML APL 8630

## (undated) DEVICE — PACK ACL SUPPLEMENT CUSTOM ASC

## (undated) DEVICE — DRSG STERI STRIP 1/2X4" R1547

## (undated) DEVICE — SU LOOP #2 TIGERLOOP AR-7234T

## (undated) DEVICE — SU MONOCRYL 3-0 PS-1 27" Y936H

## (undated) DEVICE — SUCTION MANIFOLD NEPTUNE 2 SYS 4 PORT 0702-020-000

## (undated) DEVICE — SU ETHIBOND 1 CT-1 30" X425H

## (undated) DEVICE — SU SILK 0 TIE 6X30" A306H

## (undated) DEVICE — PACK ARTHROSCOPY CUSTOM ASC

## (undated) DEVICE — LINEN GOWN XLG 5407

## (undated) DEVICE — SU VICRYL 2-0 CT-2 27" UND J269H

## (undated) DEVICE — GLOVE PROTEXIS POWDER FREE SMT 8.0  2D72PT80X

## (undated) DEVICE — SU FIBERWIRE 2 38"  AR-7200

## (undated) DEVICE — ESU GROUND PAD ADULT W/CORD E7507

## (undated) DEVICE — BLADE KNIFE SURG 10 371110

## (undated) DEVICE — SOL WATER IRRIG 1000ML BOTTLE 2F7114

## (undated) DEVICE — SU VICRYL 0 CT-1 27" UND J260H

## (undated) DEVICE — PEN MARKING SKIN W/PAPER RULER 31145785

## (undated) DEVICE — TUBING SYSTEM ARTHREX PUMP REDEUCE AR-6411

## (undated) RX ORDER — LIDOCAINE HYDROCHLORIDE 20 MG/ML
INJECTION, SOLUTION EPIDURAL; INFILTRATION; INTRACAUDAL; PERINEURAL
Status: DISPENSED
Start: 2017-10-31

## (undated) RX ORDER — ONDANSETRON 2 MG/ML
INJECTION INTRAMUSCULAR; INTRAVENOUS
Status: DISPENSED
Start: 2017-10-31

## (undated) RX ORDER — DEXAMETHASONE SODIUM PHOSPHATE 4 MG/ML
INJECTION, SOLUTION INTRA-ARTICULAR; INTRALESIONAL; INTRAMUSCULAR; INTRAVENOUS; SOFT TISSUE
Status: DISPENSED
Start: 2017-10-31

## (undated) RX ORDER — ACETAMINOPHEN 325 MG/1
TABLET ORAL
Status: DISPENSED
Start: 2017-10-31

## (undated) RX ORDER — FENTANYL CITRATE 50 UG/ML
INJECTION, SOLUTION INTRAMUSCULAR; INTRAVENOUS
Status: DISPENSED
Start: 2017-10-31

## (undated) RX ORDER — KETOROLAC TROMETHAMINE 30 MG/ML
INJECTION, SOLUTION INTRAMUSCULAR; INTRAVENOUS
Status: DISPENSED
Start: 2017-10-31

## (undated) RX ORDER — BUPIVACAINE HYDROCHLORIDE 2.5 MG/ML
INJECTION, SOLUTION EPIDURAL; INFILTRATION; INTRACAUDAL
Status: DISPENSED
Start: 2017-10-31

## (undated) RX ORDER — PROPOFOL 10 MG/ML
INJECTION, EMULSION INTRAVENOUS
Status: DISPENSED
Start: 2017-10-31

## (undated) RX ORDER — CLINDAMYCIN PHOSPHATE 900 MG/50ML
INJECTION, SOLUTION INTRAVENOUS
Status: DISPENSED
Start: 2017-10-31

## (undated) RX ORDER — EPINEPHRINE 1 MG/ML
INJECTION, SOLUTION, CONCENTRATE INTRAVENOUS
Status: DISPENSED
Start: 2017-10-31

## (undated) RX ORDER — GABAPENTIN 300 MG/1
CAPSULE ORAL
Status: DISPENSED
Start: 2017-10-31